# Patient Record
Sex: FEMALE | Race: OTHER | Employment: OTHER | URBAN - METROPOLITAN AREA
[De-identification: names, ages, dates, MRNs, and addresses within clinical notes are randomized per-mention and may not be internally consistent; named-entity substitution may affect disease eponyms.]

---

## 2019-10-25 ENCOUNTER — PREPPED CHART (OUTPATIENT)
Dept: URBAN - METROPOLITAN AREA CLINIC 25 | Facility: CLINIC | Age: 64
End: 2019-10-25

## 2022-02-09 ENCOUNTER — ESTABLISHED PATIENT (OUTPATIENT)
Dept: URBAN - METROPOLITAN AREA CLINIC 25 | Facility: CLINIC | Age: 67
End: 2022-02-09

## 2022-02-09 DIAGNOSIS — H52.4: ICD-10-CM

## 2022-02-09 DIAGNOSIS — H52.13: ICD-10-CM

## 2022-02-09 DIAGNOSIS — H52.222: ICD-10-CM

## 2022-02-09 PROCEDURE — 92310-3 LEVEL 3 CONTACT LENS MANAGEMENT

## 2022-02-09 PROCEDURE — 92014 COMPRE OPH EXAM EST PT 1/>: CPT

## 2022-02-09 PROCEDURE — 92499RSE RETINAL SCREENING ELECTIVE

## 2022-02-09 ASSESSMENT — VISUAL ACUITY
OD_CC: 20/25
OS_CC: 20/25

## 2022-02-09 ASSESSMENT — KERATOMETRY
OD_K1POWER_DIOPTERS: 44.00
OS_AXISANGLE2_DEGREES: 6
OS_K1POWER_DIOPTERS: 43.75
OS_K2POWER_DIOPTERS: 44.25
OD_K2POWER_DIOPTERS: 44.50
OS_AXISANGLE_DEGREES: 96
OD_AXISANGLE2_DEGREES: 173
OD_AXISANGLE_DEGREES: 83

## 2022-02-09 ASSESSMENT — TONOMETRY
OD_IOP_MMHG: 20
OS_IOP_MMHG: 20

## 2024-02-09 ENCOUNTER — ESTABLISHED PATIENT (OUTPATIENT)
Dept: URBAN - METROPOLITAN AREA CLINIC 25 | Facility: CLINIC | Age: 69
End: 2024-02-09

## 2024-02-09 DIAGNOSIS — H52.13: ICD-10-CM

## 2024-02-09 DIAGNOSIS — H52.4: ICD-10-CM

## 2024-02-09 DIAGNOSIS — H52.222: ICD-10-CM

## 2024-02-09 PROCEDURE — 92015 DETERMINE REFRACTIVE STATE: CPT

## 2024-02-09 PROCEDURE — 92499RS OCT RETINAL SCREENING, ELECTIVE

## 2024-02-09 PROCEDURE — 92014 COMPRE OPH EXAM EST PT 1/>: CPT

## 2024-02-09 PROCEDURE — 92310-4 LEVEL 4 CONTACT LENS MANAGEMENT

## 2024-02-09 ASSESSMENT — KERATOMETRY
OD_AXISANGLE_DEGREES: 83
OD_K2POWER_DIOPTERS: 44.50
OD_AXISANGLE_DEGREES: 74
OS_AXISANGLE2_DEGREES: 26
OD_K2POWER_DIOPTERS: 44.75
OS_AXISANGLE_DEGREES: 115
OS_K2POWER_DIOPTERS: 44.25
OD_K1POWER_DIOPTERS: 44.00
OD_K1POWER_DIOPTERS: 43.75
OS_AXISANGLE_DEGREES: 116
OD_AXISANGLE2_DEGREES: 173
OS_AXISANGLE2_DEGREES: 6
OD_AXISANGLE2_DEGREES: 164
OS_K2POWER_DIOPTERS: 44.50
OD_AXISANGLE_DEGREES: 68
OS_K1POWER_DIOPTERS: 44.00
OD_AXISANGLE2_DEGREES: 158
OS_AXISANGLE_DEGREES: 96
OS_AXISANGLE2_DEGREES: 25
OD_K1POWER_DIOPTERS: 43.50
OS_K1POWER_DIOPTERS: 43.75

## 2024-02-09 ASSESSMENT — VISUAL ACUITY
OD_CC: 20/20-1
OS_CC: 20/20-1
OD_CC: J7
OS_CC: J5

## 2024-02-09 ASSESSMENT — TONOMETRY
OS_IOP_MMHG: 19
OD_IOP_MMHG: 19

## 2024-03-07 ENCOUNTER — CONTACT LENSES/GLASSES VISIT (OUTPATIENT)
Dept: URBAN - METROPOLITAN AREA CLINIC 25 | Facility: CLINIC | Age: 69
End: 2024-03-07

## 2024-03-07 DIAGNOSIS — H52.13: ICD-10-CM

## 2024-03-07 PROCEDURE — 92310F

## 2024-03-07 ASSESSMENT — KERATOMETRY
OD_AXISANGLE_DEGREES: 68
OS_AXISANGLE2_DEGREES: 26
OD_AXISANGLE2_DEGREES: 158
OD_AXISANGLE2_DEGREES: 173
OS_AXISANGLE2_DEGREES: 25
OD_K2POWER_DIOPTERS: 44.75
OS_AXISANGLE2_DEGREES: 6
OS_K2POWER_DIOPTERS: 44.25
OD_K1POWER_DIOPTERS: 43.75
OD_K1POWER_DIOPTERS: 43.50
OS_K1POWER_DIOPTERS: 43.75
OD_AXISANGLE_DEGREES: 74
OD_AXISANGLE2_DEGREES: 164
OS_AXISANGLE_DEGREES: 96
OD_AXISANGLE_DEGREES: 83
OD_K1POWER_DIOPTERS: 44.00
OS_AXISANGLE_DEGREES: 116
OD_K2POWER_DIOPTERS: 44.50
OS_K1POWER_DIOPTERS: 44.00
OS_K2POWER_DIOPTERS: 44.50
OS_AXISANGLE_DEGREES: 115

## 2024-03-07 ASSESSMENT — VISUAL ACUITY
OS_OTHER: CL TRIAL #1
OD_CC: J3
OS_CC: 20/40
OD_CC: 20/25
OD_OTHER: CL TRIAL #1
OS_CC: J4

## 2024-03-25 ENCOUNTER — CONTACT LENSES/GLASSES VISIT (OUTPATIENT)
Dept: URBAN - METROPOLITAN AREA CLINIC 25 | Facility: CLINIC | Age: 69
End: 2024-03-25

## 2024-03-25 DIAGNOSIS — H52.13: ICD-10-CM

## 2024-03-25 PROCEDURE — 92310F

## 2024-03-25 ASSESSMENT — KERATOMETRY
OD_AXISANGLE_DEGREES: 83
OD_K1POWER_DIOPTERS: 44.00
OS_K2POWER_DIOPTERS: 44.25
OD_AXISANGLE2_DEGREES: 164
OS_AXISANGLE2_DEGREES: 6
OS_AXISANGLE2_DEGREES: 26
OS_AXISANGLE_DEGREES: 116
OD_K1POWER_DIOPTERS: 43.75
OD_AXISANGLE2_DEGREES: 173
OS_K1POWER_DIOPTERS: 44.00
OD_K1POWER_DIOPTERS: 43.50
OD_AXISANGLE_DEGREES: 68
OD_AXISANGLE2_DEGREES: 158
OS_AXISANGLE_DEGREES: 96
OS_K1POWER_DIOPTERS: 43.75
OD_K2POWER_DIOPTERS: 44.50
OD_K2POWER_DIOPTERS: 44.75
OD_AXISANGLE_DEGREES: 74
OS_K2POWER_DIOPTERS: 44.50
OS_AXISANGLE2_DEGREES: 25
OS_AXISANGLE_DEGREES: 115

## 2024-03-25 ASSESSMENT — VISUAL ACUITY
OU_CC: J2
OS_CC: 20/25
OD_CC: 20/25

## 2024-04-10 ENCOUNTER — CONTACT LENSES/GLASSES VISIT (OUTPATIENT)
Dept: URBAN - METROPOLITAN AREA CLINIC 25 | Facility: CLINIC | Age: 69
End: 2024-04-10

## 2024-04-10 DIAGNOSIS — H52.13: ICD-10-CM

## 2024-04-10 PROCEDURE — 92310F

## 2024-04-10 ASSESSMENT — KERATOMETRY
OS_AXISANGLE2_DEGREES: 25
OD_AXISANGLE2_DEGREES: 173
OS_AXISANGLE_DEGREES: 96
OD_AXISANGLE2_DEGREES: 164
OD_K2POWER_DIOPTERS: 44.75
OS_AXISANGLE2_DEGREES: 6
OD_K1POWER_DIOPTERS: 44.00
OS_AXISANGLE_DEGREES: 116
OD_AXISANGLE_DEGREES: 83
OD_AXISANGLE2_DEGREES: 158
OD_AXISANGLE_DEGREES: 74
OD_K1POWER_DIOPTERS: 43.75
OS_K2POWER_DIOPTERS: 44.50
OS_AXISANGLE2_DEGREES: 26
OD_K1POWER_DIOPTERS: 43.50
OS_K1POWER_DIOPTERS: 43.75
OS_K1POWER_DIOPTERS: 44.00
OD_AXISANGLE_DEGREES: 68
OD_K2POWER_DIOPTERS: 44.50
OS_AXISANGLE_DEGREES: 115
OS_K2POWER_DIOPTERS: 44.25

## 2024-04-10 ASSESSMENT — VISUAL ACUITY
OD_CC: 20/25
OU_CC: J2
OS_CC: 20/25

## 2024-04-22 ENCOUNTER — CONTACT LENSES/GLASSES VISIT (OUTPATIENT)
Dept: URBAN - METROPOLITAN AREA CLINIC 25 | Facility: CLINIC | Age: 69
End: 2024-04-22

## 2024-04-22 DIAGNOSIS — H52.13: ICD-10-CM

## 2024-04-22 PROCEDURE — 92310F

## 2024-04-22 ASSESSMENT — KERATOMETRY
OD_AXISANGLE2_DEGREES: 164
OD_AXISANGLE_DEGREES: 83
OS_AXISANGLE_DEGREES: 116
OS_AXISANGLE2_DEGREES: 6
OD_K2POWER_DIOPTERS: 44.75
OS_K1POWER_DIOPTERS: 43.75
OD_AXISANGLE2_DEGREES: 158
OS_AXISANGLE2_DEGREES: 25
OS_K2POWER_DIOPTERS: 44.50
OS_AXISANGLE2_DEGREES: 26
OD_AXISANGLE_DEGREES: 68
OD_AXISANGLE2_DEGREES: 173
OD_K2POWER_DIOPTERS: 44.50
OS_AXISANGLE_DEGREES: 115
OD_AXISANGLE_DEGREES: 74
OD_K1POWER_DIOPTERS: 43.75
OD_K1POWER_DIOPTERS: 44.00
OS_AXISANGLE_DEGREES: 96
OD_K1POWER_DIOPTERS: 43.50
OS_K2POWER_DIOPTERS: 44.25
OS_K1POWER_DIOPTERS: 44.00

## 2024-04-22 ASSESSMENT — VISUAL ACUITY
OD_CC: J2
OS_CC: J3
OS_CC: 20/30
OD_CC: 20/25

## 2024-04-29 ENCOUNTER — CONTACT LENSES/GLASSES VISIT (OUTPATIENT)
Dept: URBAN - METROPOLITAN AREA CLINIC 25 | Facility: CLINIC | Age: 69
End: 2024-04-29

## 2024-04-29 DIAGNOSIS — H52.13: ICD-10-CM

## 2024-04-29 PROCEDURE — 92310F

## 2024-04-29 ASSESSMENT — VISUAL ACUITY
OD_CC: 20/20
OU_CC: J2
OS_CC: 20/20

## 2024-04-29 ASSESSMENT — KERATOMETRY
OS_AXISANGLE_DEGREES: 116
OD_K1POWER_DIOPTERS: 44.00
OD_K1POWER_DIOPTERS: 43.75
OD_K2POWER_DIOPTERS: 44.75
OD_AXISANGLE2_DEGREES: 173
OD_AXISANGLE_DEGREES: 68
OS_K2POWER_DIOPTERS: 44.50
OD_AXISANGLE_DEGREES: 74
OS_AXISANGLE2_DEGREES: 25
OD_AXISANGLE2_DEGREES: 158
OS_AXISANGLE2_DEGREES: 26
OS_AXISANGLE_DEGREES: 115
OD_AXISANGLE_DEGREES: 83
OS_K2POWER_DIOPTERS: 44.25
OD_K1POWER_DIOPTERS: 43.50
OD_K2POWER_DIOPTERS: 44.50
OS_K1POWER_DIOPTERS: 43.75
OS_AXISANGLE2_DEGREES: 6
OS_AXISANGLE_DEGREES: 96
OS_K1POWER_DIOPTERS: 44.00
OD_AXISANGLE2_DEGREES: 164

## 2024-05-08 ENCOUNTER — CONTACT LENSES/GLASSES VISIT (OUTPATIENT)
Dept: URBAN - METROPOLITAN AREA CLINIC 25 | Facility: CLINIC | Age: 69
End: 2024-05-08

## 2024-05-08 DIAGNOSIS — H52.13: ICD-10-CM

## 2024-05-08 PROCEDURE — 92310F

## 2024-05-08 ASSESSMENT — KERATOMETRY
OS_K2POWER_DIOPTERS: 44.25
OD_AXISANGLE_DEGREES: 74
OS_K1POWER_DIOPTERS: 44.00
OS_AXISANGLE_DEGREES: 96
OD_AXISANGLE_DEGREES: 83
OS_K2POWER_DIOPTERS: 44.50
OD_AXISANGLE_DEGREES: 68
OD_AXISANGLE2_DEGREES: 164
OD_K2POWER_DIOPTERS: 44.50
OD_K1POWER_DIOPTERS: 43.75
OD_K1POWER_DIOPTERS: 43.50
OD_K1POWER_DIOPTERS: 44.00
OD_AXISANGLE2_DEGREES: 173
OS_K1POWER_DIOPTERS: 43.75
OD_K2POWER_DIOPTERS: 44.75
OS_AXISANGLE2_DEGREES: 6
OS_AXISANGLE_DEGREES: 115
OS_AXISANGLE2_DEGREES: 26
OD_AXISANGLE2_DEGREES: 158
OS_AXISANGLE_DEGREES: 116
OS_AXISANGLE2_DEGREES: 25

## 2024-05-08 ASSESSMENT — VISUAL ACUITY
OD_CC: 20/25
OS_CC: 20/25

## 2024-07-26 ENCOUNTER — CONTACT LENSES/GLASSES VISIT (OUTPATIENT)
Dept: URBAN - METROPOLITAN AREA CLINIC 25 | Facility: CLINIC | Age: 69
End: 2024-07-26

## 2024-07-26 DIAGNOSIS — H52.13: ICD-10-CM

## 2024-07-26 PROCEDURE — 92310F

## 2024-07-26 ASSESSMENT — KERATOMETRY
OS_K1POWER_DIOPTERS: 44.00
OS_K2POWER_DIOPTERS: 44.25
OD_K2POWER_DIOPTERS: 44.75
OD_K1POWER_DIOPTERS: 43.50
OS_K1POWER_DIOPTERS: 43.75
OS_AXISANGLE2_DEGREES: 25
OD_K1POWER_DIOPTERS: 44.00
OS_AXISANGLE_DEGREES: 96
OD_AXISANGLE_DEGREES: 83
OD_AXISANGLE2_DEGREES: 164
OD_AXISANGLE2_DEGREES: 158
OD_AXISANGLE_DEGREES: 68
OS_AXISANGLE2_DEGREES: 26
OD_K2POWER_DIOPTERS: 44.50
OS_AXISANGLE2_DEGREES: 6
OS_K2POWER_DIOPTERS: 44.50
OS_AXISANGLE_DEGREES: 116
OD_AXISANGLE_DEGREES: 74
OS_AXISANGLE_DEGREES: 115
OD_K1POWER_DIOPTERS: 43.75
OD_AXISANGLE2_DEGREES: 173

## 2024-07-26 ASSESSMENT — VISUAL ACUITY
OD_CC: 20/30
OS_CC: 20/25
OU_CC: J4

## 2024-08-09 ENCOUNTER — CONTACT LENSES/GLASSES VISIT (OUTPATIENT)
Dept: URBAN - METROPOLITAN AREA CLINIC 25 | Facility: CLINIC | Age: 69
End: 2024-08-09

## 2024-08-09 DIAGNOSIS — H52.13: ICD-10-CM

## 2024-08-09 PROCEDURE — 92310F

## 2024-08-09 ASSESSMENT — KERATOMETRY
OD_AXISANGLE2_DEGREES: 164
OS_AXISANGLE2_DEGREES: 6
OD_K2POWER_DIOPTERS: 44.75
OS_AXISANGLE_DEGREES: 115
OS_K1POWER_DIOPTERS: 43.75
OD_AXISANGLE_DEGREES: 74
OD_AXISANGLE2_DEGREES: 158
OS_AXISANGLE2_DEGREES: 26
OS_K2POWER_DIOPTERS: 44.50
OD_K1POWER_DIOPTERS: 43.75
OD_K2POWER_DIOPTERS: 44.50
OS_K2POWER_DIOPTERS: 44.25
OD_K1POWER_DIOPTERS: 43.50
OD_K1POWER_DIOPTERS: 44.00
OD_AXISANGLE_DEGREES: 83
OS_AXISANGLE_DEGREES: 96
OS_K1POWER_DIOPTERS: 44.00
OD_AXISANGLE_DEGREES: 68
OS_AXISANGLE_DEGREES: 116
OD_AXISANGLE2_DEGREES: 173
OS_AXISANGLE2_DEGREES: 25

## 2024-08-09 ASSESSMENT — VISUAL ACUITY
OS_CC: 20/25
OD_CC: 20/25

## 2024-11-01 ENCOUNTER — CONTACT LENSES/GLASSES VISIT (OUTPATIENT)
Dept: URBAN - METROPOLITAN AREA CLINIC 25 | Facility: CLINIC | Age: 69
End: 2024-11-01

## 2024-11-01 DIAGNOSIS — H52.13: ICD-10-CM

## 2024-11-01 PROCEDURE — 92310F

## 2024-11-19 ENCOUNTER — CONTACT LENSES/GLASSES VISIT (OUTPATIENT)
Dept: URBAN - METROPOLITAN AREA CLINIC 25 | Facility: CLINIC | Age: 69
End: 2024-11-19

## 2024-11-19 DIAGNOSIS — H52.13: ICD-10-CM

## 2024-11-19 PROCEDURE — 92310F

## 2025-03-24 ENCOUNTER — APPOINTMENT (OUTPATIENT)
Dept: URBAN - METROPOLITAN AREA CLINIC 328 | Facility: CLINIC | Age: 70
Setting detail: DERMATOLOGY
End: 2025-03-24

## 2025-03-24 DIAGNOSIS — D22 MELANOCYTIC NEVI: ICD-10-CM | Status: STABLE

## 2025-03-24 DIAGNOSIS — D18.0 HEMANGIOMA: ICD-10-CM | Status: STABLE

## 2025-03-24 DIAGNOSIS — L81.4 OTHER MELANIN HYPERPIGMENTATION: ICD-10-CM | Status: STABLE

## 2025-03-24 DIAGNOSIS — L82.0 INFLAMED SEBORRHEIC KERATOSIS: ICD-10-CM | Status: INADEQUATELY CONTROLLED

## 2025-03-24 DIAGNOSIS — Z85.828 PERSONAL HISTORY OF OTHER MALIGNANT NEOPLASM OF SKIN: ICD-10-CM | Status: STABLE

## 2025-03-24 DIAGNOSIS — L57.0 ACTINIC KERATOSIS: ICD-10-CM

## 2025-03-24 DIAGNOSIS — L82.1 OTHER SEBORRHEIC KERATOSIS: ICD-10-CM | Status: STABLE

## 2025-03-24 PROBLEM — D22.5 MELANOCYTIC NEVI OF TRUNK: Status: ACTIVE | Noted: 2025-03-24

## 2025-03-24 PROBLEM — D48.5 NEOPLASM OF UNCERTAIN BEHAVIOR OF SKIN: Status: ACTIVE | Noted: 2025-03-24

## 2025-03-24 PROBLEM — D18.01 HEMANGIOMA OF SKIN AND SUBCUTANEOUS TISSUE: Status: ACTIVE | Noted: 2025-03-24

## 2025-03-24 PROCEDURE — ? LIQUID NITROGEN

## 2025-03-24 PROCEDURE — ? FULL BODY SKIN EXAM

## 2025-03-24 PROCEDURE — ? BIOPSY BY PUNCH METHOD

## 2025-03-24 PROCEDURE — 11105 PUNCH BX SKIN EA SEP/ADDL: CPT

## 2025-03-24 PROCEDURE — ? ADDITIONAL NOTES

## 2025-03-24 PROCEDURE — ? TREATMENT REGIMEN

## 2025-03-24 PROCEDURE — 17111 DESTRUCTION B9 LESIONS 15/>: CPT

## 2025-03-24 PROCEDURE — 17000 DESTRUCT PREMALG LESION: CPT | Mod: 59

## 2025-03-24 PROCEDURE — 99203 OFFICE O/P NEW LOW 30 MIN: CPT | Mod: 25

## 2025-03-24 PROCEDURE — 11104 PUNCH BX SKIN SINGLE LESION: CPT | Mod: 59

## 2025-03-24 PROCEDURE — 17003 DESTRUCT PREMALG LES 2-14: CPT | Mod: 59

## 2025-03-24 PROCEDURE — ? COUNSELING

## 2025-03-24 ASSESSMENT — LOCATION DETAILED DESCRIPTION DERM
LOCATION DETAILED: EPIGASTRIC SKIN
LOCATION DETAILED: LEFT MEDIAL BREAST 8-9:00 REGION
LOCATION DETAILED: LEFT INFRAMAMMARY CREASE (OUTER QUADRANT)
LOCATION DETAILED: MIDDLE STERNUM
LOCATION DETAILED: LEFT MEDIAL BREAST 7-8:00 REGION
LOCATION DETAILED: LEFT RIB CAGE
LOCATION DETAILED: RIGHT LATERAL BREAST 11-12:00 REGION
LOCATION DETAILED: LEFT ANTERIOR PROXIMAL UPPER ARM
LOCATION DETAILED: LEFT AXILLARY TAIL OF BREAST
LOCATION DETAILED: INFERIOR THORACIC SPINE
LOCATION DETAILED: LEFT ANTERIOR SHOULDER
LOCATION DETAILED: SUBXIPHOID
LOCATION DETAILED: RIGHT LATERAL BREAST 10-11:00 REGION
LOCATION DETAILED: RIGHT MEDIAL SUPERIOR CHEST
LOCATION DETAILED: RIGHT SUPERIOR MEDIAL MIDBACK
LOCATION DETAILED: LEFT MID-UPPER BACK
LOCATION DETAILED: RIGHT INFRAMAMMARY CREASE (INNER QUADRANT)
LOCATION DETAILED: XIPHOID

## 2025-03-24 ASSESSMENT — LOCATION SIMPLE DESCRIPTION DERM
LOCATION SIMPLE: LEFT SHOULDER
LOCATION SIMPLE: RIGHT LOWER BACK
LOCATION SIMPLE: LEFT UPPER BACK
LOCATION SIMPLE: UPPER BACK
LOCATION SIMPLE: RIGHT BREAST
LOCATION SIMPLE: ABDOMEN
LOCATION SIMPLE: CHEST
LOCATION SIMPLE: LEFT BREAST
LOCATION SIMPLE: LEFT UPPER ARM

## 2025-03-24 ASSESSMENT — LOCATION ZONE DERM
LOCATION ZONE: TRUNK
LOCATION ZONE: ARM

## 2025-03-24 NOTE — PROCEDURE: ADDITIONAL NOTES
Render Risk Assessment In Note?: no
Detail Level: Simple
Additional Notes: Patient will consult with  Yamilka for cosmetic procedures.

## 2025-03-24 NOTE — HPI: EVALUATION OF SKIN LESION(S)
What Type Of Note Output Would You Prefer (Optional)?: Bullet Format
Hpi Title: Evaluation of Skin Lesions
Family Member: Father, Uncle

## 2025-03-24 NOTE — PROCEDURE: LIQUID NITROGEN
Show Aperture Variable?: Yes
Post-Care Instructions: I reviewed with the patient in detail post-care instructions. Patient is to wear sunprotection, and avoid picking at any of the treated lesions. Pt may apply Vaseline to crusted or scabbing areas.
Detail Level: Detailed
Render Note In Bullet Format When Appropriate: No
Duration Of Freeze Thaw-Cycle (Seconds): 0
Consent: The patient's consent was obtained including but not limited to risks of crusting, scabbing, blistering, scarring, darker or lighter pigmentary change, recurrence, incomplete removal and infection.
Medical Necessity Clause: This procedure was medically necessary because the lesions that were treated were:
Spray Paint Text: The liquid nitrogen was applied to the skin utilizing a spray paint frosting technique.
Medical Necessity Information: It is in your best interest to select a reason for this procedure from the list below. All of these items fulfill various CMS LCD requirements except the new and changing color options.

## 2025-03-24 NOTE — PROCEDURE: BIOPSY BY PUNCH METHOD
Detail Level: Detailed
Was A Bandage Applied: Yes
Punch Size In Mm: 3
Size Of Lesion In Cm (Optional): 0
Depth Of Punch Biopsy: dermis
Biopsy Type: H and E
Anesthesia Type: 1% lidocaine with epinephrine
Anesthesia Volume In Cc: 1
Hemostasis: None
Epidermal Sutures: gel foam
Wound Care: Vaseline
Dressing: bandage
Patient Will Remove Sutures At Home?: No
Lab: 6
Consent was obtained and risks were reviewed including but not limited to scarring, infection, bleeding, scabbing, incomplete removal, nerve damage and allergy to anesthesia.
Post-Care Instructions: 1. Keep the wound dry and covered with a bandage for the first 24 hours after procedure. Thereafter, change the bandage twice a day.  Gently clean the wound with water and then gently pat the wound dry. Gently apply a thick layer of Vaseline to the wound and cover with a bandage, 2 days after your biopsy.  If the bandage gets wet, replace it with a dry bandage. For facial biopsies, the area may remain uncovered after 3-4 days. For other areas of the body, we recommend covering the wound for 7 days. After 7 days, the wound can remain uncovered.\\n \\n2. We do not recommend Neosporin in our practice due to the percentage of the population that can develop an allergy to it.\\n \\n3. Showering is fine; if the bandage gets wet, just replace it with a dry bandage (no soaking in a bathtub or a pool for 5 days).  \\n\\n4.  If bruising is a concern, Arnica is a supplement that minimizes bruising.  To prevent bruising, the supplement can be taken once daily for 1 week prior to the procedure.  This can also be taken starting on the same day as the procedure to minimize bruising and quicken the healing process if bruises do appear.  If starting on the same day as the procedure, please follow instructions on the package.  This supplement is available at local vitamin shops. \\n\\n5. Your results may take up to 14 days to come in. Our office will call you with results at this time. In some cases, the biopsy will indicate that more skin must be removed in order to remove abnormal cells.  If this is the case, you will then be asked to return to the office for additional information and/or treatment.\\n\\n\\nOnce the wound has healed, to help with minimizing the scar, a product called Scar Recovery Gel can be applied twice a day to the area.  This product will decrease the likelihood of the formation of a hypertrophic scar, and decrease the appearance of red or pink pigment changes in the scar.  The gel has also been shown to significantly decrease itching while the wound heals. Your provider will discuss this product with you after your biopsy results have come in.\\n \\n** Call us if you experience any problems or have any questions.** If it is after hours and you are having bleeding or problems with your wound, please go to the nearest emergency room and Dr. Diana will be contacted as indicated.
Home Suture Removal Text: Patient was provided a home suture removal kit and will remove their sutures at home.  If they have any questions or difficulties they will call the office.
Notification Instructions: Patient will be notified of biopsy results. However, patient instructed to call the office if not contacted within 2 weeks.
Billing Type: Third-Party Bill
Information: Selecting Yes will display possible errors in your note based on the variables you have selected. This validation is only offered as a suggestion for you. PLEASE NOTE THAT THE VALIDATION TEXT WILL BE REMOVED WHEN YOU FINALIZE YOUR NOTE. IF YOU WANT TO FAX A PRELIMINARY NOTE YOU WILL NEED TO TOGGLE THIS TO 'NO' IF YOU DO NOT WANT IT IN YOUR FAXED NOTE.
Punch Size In Mm: 2

## 2025-03-27 ENCOUNTER — COMPREHENSIVE EXAM (OUTPATIENT)
Age: 70
End: 2025-03-27

## 2025-03-27 DIAGNOSIS — H52.13: ICD-10-CM

## 2025-03-27 PROCEDURE — 92014 COMPRE OPH EXAM EST PT 1/>: CPT

## 2025-03-27 PROCEDURE — 92015 DETERMINE REFRACTIVE STATE: CPT

## 2025-03-27 PROCEDURE — 92499RS OCT RETINAL SCREENING, ELECTIVE

## 2025-03-27 PROCEDURE — 92310-2 LEVEL 2 SOFT LENS UPDATE

## 2025-04-02 ENCOUNTER — APPOINTMENT (OUTPATIENT)
Dept: URBAN - METROPOLITAN AREA CLINIC 328 | Facility: CLINIC | Age: 70
Setting detail: DERMATOLOGY
End: 2025-04-02

## 2025-04-02 DIAGNOSIS — L57.0 ACTINIC KERATOSIS: ICD-10-CM

## 2025-04-02 DIAGNOSIS — L72.0 EPIDERMAL CYST: ICD-10-CM

## 2025-04-02 DIAGNOSIS — L82.0 INFLAMED SEBORRHEIC KERATOSIS: ICD-10-CM

## 2025-04-02 PROBLEM — C44.311 BASAL CELL CARCINOMA OF SKIN OF NOSE: Status: ACTIVE | Noted: 2025-04-02

## 2025-04-02 PROCEDURE — ? SURGICAL DECISION MAKING

## 2025-04-02 PROCEDURE — ? ADDITIONAL NOTES

## 2025-04-02 PROCEDURE — ? COUNSELING

## 2025-04-02 PROCEDURE — ? LIQUID NITROGEN

## 2025-04-02 PROCEDURE — 17000 DESTRUCT PREMALG LESION: CPT | Mod: 59,79

## 2025-04-02 PROCEDURE — ? PATHOLOGY DISCUSSION

## 2025-04-02 PROCEDURE — 17110 DESTRUCTION B9 LES UP TO 14: CPT | Mod: 79

## 2025-04-02 PROCEDURE — ? DEFER

## 2025-04-02 PROCEDURE — 99213 OFFICE O/P EST LOW 20 MIN: CPT | Mod: 24,25

## 2025-04-02 ASSESSMENT — LOCATION DETAILED DESCRIPTION DERM
LOCATION DETAILED: LEFT AXILLARY VAULT
LOCATION DETAILED: RIGHT INFRAMAMMARY CREASE (INNER QUADRANT)
LOCATION DETAILED: LEFT INFRAMAMMARY CREASE (INNER QUADRANT)
LOCATION DETAILED: LEFT INFRAMAMMARY CREASE (OUTER QUADRANT)
LOCATION DETAILED: RIGHT INFRAMAMMARY CREASE (OUTER QUADRANT)
LOCATION DETAILED: INFERIOR THORACIC SPINE
LOCATION DETAILED: RIGHT POSTERIOR SHOULDER
LOCATION DETAILED: RIGHT AXILLARY VAULT
LOCATION DETAILED: LEFT MEDIAL BREAST 8-9:00 REGION
LOCATION DETAILED: LEFT RIB CAGE

## 2025-04-02 ASSESSMENT — LOCATION ZONE DERM
LOCATION ZONE: AXILLAE
LOCATION ZONE: ARM
LOCATION ZONE: TRUNK

## 2025-04-02 ASSESSMENT — LOCATION SIMPLE DESCRIPTION DERM
LOCATION SIMPLE: ABDOMEN
LOCATION SIMPLE: UPPER BACK
LOCATION SIMPLE: LEFT BREAST
LOCATION SIMPLE: RIGHT BREAST
LOCATION SIMPLE: RIGHT SHOULDER
LOCATION SIMPLE: LEFT AXILLARY VAULT
LOCATION SIMPLE: RIGHT AXILLARY VAULT

## 2025-04-09 ENCOUNTER — APPOINTMENT (OUTPATIENT)
Dept: URBAN - METROPOLITAN AREA CLINIC 331 | Facility: CLINIC | Age: 70
Setting detail: DERMATOLOGY
End: 2025-04-09

## 2025-04-09 PROBLEM — C44.311 BASAL CELL CARCINOMA OF SKIN OF NOSE: Status: ACTIVE | Noted: 2025-04-09

## 2025-04-09 PROCEDURE — ? CONSULTATION FOR MOHS SURGERY

## 2025-04-09 PROCEDURE — 99213 OFFICE O/P EST LOW 20 MIN: CPT | Mod: 24

## 2025-04-09 PROCEDURE — ? REFERRAL

## 2025-04-15 ENCOUNTER — CONTACT LENSES/GLASSES VISIT (OUTPATIENT)
Age: 70
End: 2025-04-15

## 2025-04-15 DIAGNOSIS — H52.13: ICD-10-CM

## 2025-04-15 PROCEDURE — 92310F

## 2025-05-05 ENCOUNTER — APPOINTMENT (OUTPATIENT)
Dept: URBAN - METROPOLITAN AREA CLINIC 331 | Facility: CLINIC | Age: 70
Setting detail: DERMATOLOGY
End: 2025-05-05

## 2025-05-05 PROBLEM — C44.311 BASAL CELL CARCINOMA OF SKIN OF NOSE: Status: ACTIVE | Noted: 2025-05-05

## 2025-05-05 PROCEDURE — 17312 MOHS ADDL STAGE: CPT

## 2025-05-05 PROCEDURE — 17311 MOHS 1 STAGE H/N/HF/G: CPT

## 2025-05-05 PROCEDURE — ? MOHS SURGERY

## 2025-05-05 NOTE — PROCEDURE: MOHS SURGERY
Mohs Case Number: [id number]
Biopsy Photograph Reviewed: Yes
Consent Type: Consent 1 (Standard)
Eye Shield Used: No
Surgeon Performing Repair (Optional): Dr. Baca
Initial Size Of Lesion: 0.9
Number Of Stages: 1
Primary Defect Length In Cm (Final Defect Size - Required For Flaps/Grafts): 1.2
Primary Defect Depth In Cm (Optional But Required For Some Insurers): 0
Repair Type: Referred to plastics for closure
Which Instrument Did You Use For Dermabrasion?: Wire Brush
Which Eyelid Repair Cpt Are You Using?: 12656
Oculoplastic Surgeon Procedure Text (A): After obtaining clear surgical margins the patient was sent to oculoplastics for surgical repair.  The patient understands they will receive post-surgical care and follow-up from the referring physician's office.
Otolaryngologist Procedure Text (A): After obtaining clear surgical margins the patient was sent to otolaryngology for surgical repair.  The patient understands they will receive post-surgical care and follow-up from the referring physician's office.
Which Plastic Surgeon Are You Referring To?: A
Plastic Surgeon (A): Travis Pearson
Plastic Surgeon Procedure Text (A): After obtaining clear surgical margins the patient was sent to plastics for surgical repair.  The patient understands they will receive post-surgical care and follow-up from the referring physician's office.
Mid-Level Procedure Text (A): After obtaining clear surgical margins the patient was sent to a mid-level provider for surgical repair.  The patient understands they will receive post-surgical care and follow-up from the mid-level provider.
Provider Procedure Text (A): After obtaining clear surgical margins the defect was repaired by another provider.
Asc Procedure Text (A): After obtaining clear surgical margins the patient was sent to an ASC for surgical repair.  The patient understands they will receive post-surgical care and follow-up from the ASC physician.
Deep Sutures: 4-0 Monocryl
Epidermal Sutures: 5-0 Polypropylene
Suture Removal: 7 days
Suturegard Retention Suture: 2-0 Nylon
Retention Suture Bite Size: 3 mm
Length To Time In Minutes Device Was In Place: 10
Undermining Type: Entire Wound
Debridement Text: The wound edges were debrided prior to proceeding with the closure to facilitate wound healing.
Helical Rim Text: The closure involved the helical rim.
Vermilion Border Text: The closure involved the vermilion border.
Nostril Rim Text: The closure involved the nostril rim.
Retention Suture Text: Retention sutures were placed to support the closure and prevent dehiscence.
Area H Indication Text: Tumors in this location are included in Area H (eyelids, eyebrows, nose, lips, chin, ear, pre-auricular, post-auricular, temple, genitalia, hands, feet, ankles and areola). Mohs surgery is indicated for tumors in these anatomic locations. Tissue conservation is critical in these anatomic locations to maximize functional and aesthetic outcomes.\\n\\n<b>Detailed documentation of histology, including histopathologic findings on microscopic examination of the tumor during today's Mohs procedure, are notated along with the Mohs maps from each stage of Mohs micrographic surgery.</b>
Area M Indication Text: Tumors in this location are included in Area M (cheek, forehead, scalp, neck, jawline, and pretibial skin). Mohs surgery is indicated for tumors in these anatomic locations. Tissue conservation is critical in these anatomic locations to maximize functional and aesthetic outcomes.\\n\\n<b>Detailed documentation of histology, including histopathologic findings on microscopic examination of the tumor during today's Mohs procedure, are notated along with the Mohs maps from each stage of Mohs micrographic surgery.</b>
Area L Indication Text: Tumors in this location are included in Area L (trunk and extremities).  Mohs surgery is indicated for larger tumors, tumors with aggressive histologic features, recurrent tumors, or tumors previously excised with positive margins in these anatomic locations.\\n\\n<b>Detailed documentation of histology, including histopathologic findings on microscopic examination of the tumor during today's Mohs procedure, are notated along with the Mohs maps from each stage of Mohs micrographic surgery.</b>
Special Stains Stage 1 - Results: Base On Clearance Noted Above
Stage 2: Additional Anesthesia Type: 1% lidocaine with epinephrine
Staging Info: By selecting yes to the question above you will include information on AJCC 8 tumor staging in your Mohs note. Information on tumor staging will be automatically added for SCCs on the head and neck. AJCC 8 includes tumor size, tumor depth, perineural involvement and bone invasion.
Tumor Depth: Less than 6mm from granular layer and no invasion beyond the subcutaneous fat
Was The Patient On Physician Recommended Anticoagulation Therapy?: Please Select the Appropriate Response
Medical Necessity Statement: Based on my medical judgement, Mohs surgery is medically necessary as it is the most appropriate treatment for this cancer compared to other treatments.
Alternatives Discussed Intro (Do Not Add Period): I discussed alternative treatments to Mohs surgery and specifically discussed the risks and benefits of
Consent 1/Introductory Paragraph: The rationale for Mohs surgery was explained to the patient and written informed consent was obtained. The risks, benefits, and alternatives to Mohs Surgery were discussed in detail. Specifically, the risks of infection, scarring, bleeding, prolonged wound healing, incomplete removal, allergy to anesthesia, nerve injury, and recurrence were addressed. Prior to beginning the procedure, the surgical site was identified with the assistance of the patient and the medical record, including photographs and/or maps if available. The site was then clearly confirmed by the patient by direct visualization and/or the use of a hand mirror and a cotton-tipped applicator stick. All components of Universal Protocol/PAUSE Rule were completed: Prior to beginning the procedure, a pause was taken during which the surgeon, his assistant, and the patient verbally confirmed the patient's identification, the intended procedure, and the correct surgical site. Pause time: 8:22 am
Consent 2/Introductory Paragraph: The rationale for Mohs surgery was explained to the patient and written informed consent was obtained. The risks, benefits, and alternatives to Mohs Surgery were discussed in detail. Specifically, the risks of infection, scarring, bleeding, prolonged wound healing, incomplete removal, allergy to anesthesia, nerve injury, and recurrence were addressed. Given the site on the leg, the patient was also counseled about the high risk of failure of surgical repair, wound falling apart (dehiscence), infection, and chronic wound formation, which could take months or longer to heal. The patient expressed understanding and desired to proceed. Prior to beginning the procedure, the surgical site was identified with the assistance of the patient and the medical record, including photographs and/or maps if available. The site was then clearly confirmed by the patient by direct visualization and/or the use of a hand mirror and a cotton-tipped applicator stick. All components of Universal Protocol/PAUSE Rule were completed: Prior to beginning the procedure, a pause was taken during which the surgeon, his assistant, and the patient verbally confirmed the patient's identification, the intended procedure, and the correct surgical site. Pause time:
Consent 3/Introductory Paragraph: The rationale for Mohs surgery was explained to the patient and written informed consent was obtained. The risks, benefits, and alternatives to Mohs Surgery were discussed in detail. Specifically, the risks of infection, scarring, bleeding, prolonged wound healing, incomplete removal, allergy to anesthesia, nerve injury, and recurrence were addressed. Given the site, the patient was also counseled about the risk of nerve damage, which could result in potentially permanent tingling, paresthesia, or lack of sensation on this side of the forehead and / or scalp. The patient expressed understanding and desired to proceed. Prior to beginning the procedure, the surgical site was identified with the assistance of the patient and the medical record, including photographs and/or maps if available. The site was then clearly confirmed by the patient by direct visualization and/or the use of a hand mirror and a cotton-tipped applicator stick. All components of Universal Protocol/PAUSE Rule were completed: Prior to beginning the procedure, a pause was taken during which the surgeon, his assistant, and the patient verbally confirmed the patient's identification, the intended procedure, and the correct surgical site. Pause time:
Consent (Temporal Branch)/Introductory Paragraph: The rationale for Mohs surgery was explained to the patient and written informed consent was obtained. The risks, benefits, and alternatives to Mohs Surgery were discussed in detail. Specifically, the risks of infection, scarring, bleeding, prolonged wound healing, incomplete removal, allergy to anesthesia, nerve injury, and recurrence were addressed. Given the site, the patient was also counseled about the risk of damage to the temporal branch of the facial nerve, potentially permanently removing the ability to raise the eyebrow on this side of the face. The patient expressed understanding and desired to proceed. Prior to beginning the procedure, the surgical site was identified with the assistance of the patient and the medical record, including photographs and/or maps if available. The site was then clearly confirmed by the patient by direct visualization and/or the use of a hand mirror and a cotton-tipped applicator stick. All components of Universal Protocol/PAUSE Rule were completed: Prior to beginning the procedure, a pause was taken during which the surgeon, his assistant, and the patient verbally confirmed the patient's identification, the intended procedure, and the correct surgical site. Pause time:
Consent (Marginal Mandibular)/Introductory Paragraph: The rationale for Mohs surgery was explained to the patient and written informed consent was obtained. The risks, benefits, and alternatives to Mohs Surgery were discussed in detail. Specifically, the risks of infection, scarring, bleeding, prolonged wound healing, incomplete removal, allergy to anesthesia, nerve injury, and recurrence were addressed. Given the site, the patient was also counseled about the risk of damage to the marginal mandibular nerve, potentially permanently resulting in an asymmetrical smile, inability to pull the lower lip downward or outward, inability to rotate lip outward, and drooling. The patient expressed understanding and desired to proceed. Prior to beginning the procedure, the surgical site was identified with the assistance of the patient and the medical record, including photographs and/or maps if available. The site was then clearly confirmed by the patient by direct visualization and/or the use of a hand mirror and a cotton-tipped applicator stick. All components of Universal Protocol/PAUSE Rule were completed: Prior to beginning the procedure, a pause was taken during which the surgeon, his assistant, and the patient verbally confirmed the patient's identification, the intended procedure, and the correct surgical site. Pause time:
Consent (Spinal Accessory)/Introductory Paragraph: The rationale for Mohs surgery was explained to the patient and written informed consent was obtained. The risks, benefits, and alternatives to Mohs Surgery were discussed in detail. Specifically, the risks of infection, scarring, bleeding, prolonged wound healing, incomplete removal, allergy to anesthesia, nerve injury, and recurrence were addressed. Given the site, the patient was also counseled about the risk of damage to the spinal accessory nerve, potentially permanently resulting in chronic aching of the shoulders, inability to abduct the shoulder, wasting of the shoulder (trapezius) muscles, dropped shoulder, and paresthesias in the arm. The patient expressed understanding and desired to proceed. Prior to beginning the procedure, the surgical site was identified with the assistance of the patient and the medical record, including photographs and/or maps if available. The site was then clearly confirmed by the patient by direct visualization and/or the use of a hand mirror and a cotton-tipped applicator stick. All components of Universal Protocol/PAUSE Rule were completed: Prior to beginning the procedure, a pause was taken during which the surgeon, his assistant, and the patient verbally confirmed the patient's identification, the intended procedure, and the correct surgical site. Pause time:
Consent (Near Eyelid Margin)/Introductory Paragraph: The rationale for Mohs was explained to the patient and consent was obtained. The risks, benefits and alternatives to therapy were discussed in detail. Specifically, the risks of ectropion or eyelid deformity, infection, scarring, bleeding, prolonged wound healing, incomplete removal, allergy to anesthesia, nerve injury and recurrence were addressed. Prior to beginning the procedure, the surgical site was identified with the assistance of the patient and the medical record, including photographs and/or maps if available. The site was then clearly confirmed by the patient by direct visualization and/or the use of a hand mirror and a cotton-tipped applicator stick. All components of Universal Protocol/PAUSE Rule were completed: Prior to beginning the procedure, a pause was taken during which the surgeon, his assistant, and the patient verbally confirmed the patient's identification, the intended procedure, and the correct surgical site. Pause time:
Consent (Ear)/Introductory Paragraph: The rationale for Mohs was explained to the patient and consent was obtained. The risks, benefits and alternatives to therapy were discussed in detail. Specifically, the risks of ear deformity, infection, scarring, bleeding, prolonged wound healing, incomplete removal, allergy to anesthesia, nerve injury and recurrence were addressed. Prior to beginning the procedure, the surgical site was identified with the assistance of the patient and the medical record, including photographs and/or maps if available. The site was then clearly confirmed by the patient by direct visualization and/or the use of a hand mirror and a cotton-tipped applicator stick. All components of Universal Protocol/PAUSE Rule were completed: Prior to beginning the procedure, a pause was taken during which the surgeon, his assistant, and the patient verbally confirmed the patient's identification, the intended procedure, and the correct surgical site. Pause time:
Consent (Nose)/Introductory Paragraph: The rationale for Mohs was explained to the patient and consent was obtained. The risks, benefits and alternatives to therapy were discussed in detail. Specifically, the risks of nasal deformity, nasal asymmetry, changes in the flow of air through the nose, infection, scarring, bleeding, prolonged wound healing, incomplete removal, allergy to anesthesia, nerve injury and recurrence were addressed. Prior to beginning the procedure, the surgical site was identified with the assistance of the patient and the medical record, including photographs and/or maps if available. The site was then clearly confirmed by the patient by direct visualization and/or the use of a hand mirror and a cotton-tipped applicator stick. All components of Universal Protocol/PAUSE Rule were completed: Prior to beginning the procedure, a pause was taken during which the surgeon, his assistant, and the patient verbally confirmed the patient's identification, the intended procedure, and the correct surgical site. Pause time:
Consent (Lip)/Introductory Paragraph: The rationale for Mohs was explained to the patient and consent was obtained. The risks, benefits and alternatives to therapy were discussed in detail. Specifically, the risks of lip deformity, changes in the oral aperture, infection, scarring, bleeding, prolonged wound healing, incomplete removal, allergy to anesthesia, nerve injury and recurrence were addressed. Prior to beginning the procedure, the surgical site was identified with the assistance of the patient and the medical record, including photographs and/or maps if available. The site was then clearly confirmed by the patient by direct visualization and/or the use of a hand mirror and a cotton-tipped applicator stick. All components of Universal Protocol/PAUSE Rule were completed: Prior to beginning the procedure, a pause was taken during which the surgeon, his assistant, and the patient verbally confirmed the patient's identification, the intended procedure, and the correct surgical site. Pause time:
Consent (Scalp)/Introductory Paragraph: The rationale for Mohs was explained to the patient and consent was obtained. The risks, benefits and alternatives to therapy were discussed in detail. Specifically, the risks of changes in hair growth pattern secondary to repair, infection, scarring, bleeding, prolonged wound healing, incomplete removal, allergy to anesthesia, nerve injury and recurrence were addressed. Prior to beginning the procedure, the surgical site was identified with the assistance of the patient and the medical record, including photographs and/or maps if available. The site was then clearly confirmed by the patient by direct visualization and/or the use of a hand mirror and a cotton-tipped applicator stick. All components of Universal Protocol/PAUSE Rule were completed: Prior to beginning the procedure, a pause was taken during which the surgeon, his assistant, and the patient verbally confirmed the patient's identification, the intended procedure, and the correct surgical site. Pause time:
Detail Level: Detailed
Postop Diagnosis: same
Anesthesia Type: 0.5% lidocaine with 1:200,000 epinephrine and a 1:10 solution of 8.4% sodium bicarbonate
Anesthesia Volume In Cc: 6
Hemostasis: Electrocautery
Estimated Blood Loss (Cc): minimal
Repair Anesthesia Method: local infiltration
Anesthesia Volume In Cc: 1.5
Repair Hemostasis (Optional): Pressure
Brow Lift Text: A midfrontal incision was made medially to the defect to allow access to the tissues just superior to the left eyebrow. Following careful dissection inferiorly in a supraperiosteal plane to the level of the left eyebrow, several 3-0 monocryl sutures were used to resuspend the eyebrow orbicularis oculi muscular unit to the superior frontal bone periosteum. This resulted in an appropriate reapproximation of static eyebrow symmetry and correction of the left brow ptosis.
Suturegard Intro: Intraoperative tissue expansion was performed, utilizing the SUTUREGARD device, in order to reduce wound tension.
Suturegard Body: The suture ends were repeatedly re-tightened and re-clamped to achieve the desired tissue expansion.
Hemigard Intro: Due to skin fragility and wound tension, it was decided to use HEMIGARD adhesive retention suture devices to permit a linear closure. The skin was cleaned and dried for a 6cm distance away from the wound. Excessive hair, if present, was removed to allow for adhesion.
Hemigard Postcare Instructions: The HEMIGARD strips are to remain completely dry for at least 5-7 days.
Donor Site Anesthesia Type: same as repair anesthesia
Epidermal Closure Graft Donor Site (Optional): none-secondary intention
Graft Donor Site Bandage (Optional-Leave Blank If You Don't Want In Note): Hemostasis was meticulously ensured achieved, gelfoam was applied, and a pressure dressing was placed.
Closure 2 Information: This tab is for additional flaps and grafts, including complex repair and grafts and complex repair and flaps. You can also specify a different location for the additional defect, if the location is the same you do not need to select a new one. We will insert the automated text for the repair you select below just as we do for solitary flaps and grafts. Please note that at this time if you select a location with a different insurance zone you will need to override the ICD10 and CPT if appropriate.
Closure 3 Information: This tab is for additional flaps and grafts above and beyond our usual structured repairs.  Please note if you enter information here it will not currently bill and you will need to add the billing information manually.
Wound Care: Petrolatum
Dressing: pressure dressing
Dressing (No Sutures): dry sterile dressing
Post-Care Instructions: MOHS/ SURGERY POST-OPERATIVE INSTRUCTIONS\\n\\nWOUND CARE\\nKeep the bandage dry until 24 hours after surgery. Thereafter, change the bandage twice a day until sutures are removed. You may soak the bandage to help it peel off. Gently clean the wound with a Dial bar soap and water, and then gently pat the wound dry. Gently apply a thick layer of Vaseline to the wound, or Mupirocin if it has been prescribed to you. Cover the wound with a Band-Aid or non-stick gauze (e.g. Telfa®), and paper tape. Repeat this process daily until sutures are removed.\\n\\nWe do not recommend using over-the-counter antibiotic ointment given the high chance of developing allergic reactions in covered surgical wounds. Do not apply alcohol or hydrogen peroxide directly to the wound. \\n\\n \\n\\nFor hands, wrists, and forearms:\\nAfter surgery, you will be in a bulky dressing (bandage) with a velcro splint that goes from the hand to the middle of the forearm, with the fingers free. The splint is similar to a cast, however; the purpose of this splint is to decrease the mobility of your hand to prevent over working incision site. The splint protects the incision and the surgical repair, as well as lessen swelling. The splint can be removed and must be kept dry. When showering or bathing, remove bandage and the splint and refer to post op instructions for wound care.  Elevate your hand above your heart as much as possible to lessen swelling and pain. Pillows and blankets under the arm are helpful when you go to sleep.\\n\\n\\nBLEEDING\\nIt is fairly common for the incision to ooze or bleed, especially in the first several hours after surgery. This can be controlled by removing the bandage we applied and then applying constant, direct pressure to the bleeding site with a clean bandage or paper towel for 20 minutes (without peeking). If the bleeding continues, repeat the above procedure for an additional 20 minutes. If the incision continues to bleed after this time, call the office at [phone]. \\n\\nDISCOMFORT\\nIf you have discomfort following surgery, take two Extra Strength Tylenol® (total of 1,000 mg) every 6 hours OR a prescription pain medication if one has been prescribed to you. If this is not sufficient to control the pain, please call the office. AVOID medications that contain aspirin, ibuprofen, or naproxyn (e.g. Alleve®, Excedrin®, Bufferin®), as these products increase the risk of bleeding.\\n\\nSWELLING\\nLocal swelling is common after surgery. Apply an ice pack over the dressing – on for 20 minutes and off for 1 hour. Repeat until bedtime. You may continue this, if necessary, as long as the swelling persists. This will help with swelling, pain, and bleeding.\\n\\nACTIVITY\\nPlease refrain from any strenuous physical activity until your sutures have been removed. This includes lifting weights, going to the gym, or doing any type of stretching in the area the surgery was done. Any of these activities could cause your sutures to break and open your wound.\\n\\nALCOHOL\\nAvoid drinking alcohol for 48 hours following surgery, as alcohol increases the risk of bleeding.\\n\\nSMOKING\\nTo promote better healing and reduce the chance of complications, it is STRONGLY RECOMMENDED THAT YOU REFRAIN FROM SMOKING until the sutures are removed.\\n\\nSHOWERING\\nUnless instructed otherwise, you may resume showering 24 hours after surgery. \\n\\nSUTURE REMOVAL\\nWhen wounds are sutured, there are generally two layers of stitches placed. There are invisible stitches under the skin and visible ones above the skin. Stitches above the skin are removed in 1-2 weeks as instructed. Stitches under the skin absorb on their own in 8 weeks to 6 months depending on the type of material used. Occasionally, one of the stitches under the skin may work itself through the skin before being absorbed. If this occurs, call the office so we can remove this “spitting” deep suture.\\n\\nFOLLOW-UP\\nIt is imperative that you have routine follow-up with your dermatologist for skin checks. This should be arranged through your dermatologist’s office. \\n\\n\\nIf Home Health was recommended for your wound, you may contact them within 24 hours if you have not heard from them contact [facility] [phone].\\n\\n\\nIf you have any questions or concerns regarding your surgery, please call the office at \\[phone].
Pain Refusal Text: I offered to prescribe pain medication but the patient refused to take this medication.
Mauc Instructions: By selecting yes to the question below the MAUC number will be added into the note.  This will be calculated automatically based on the diagnosis chosen, the size entered, the body zone selected (H,M,L) and the specific indications you chose. You will also have the option to override the Mohs AUC if you disagree with the automatically calculated number and this option is found in the Case Summary tab.
Where Do You Want The Question To Include Opioid Counseling Located?: Case Summary Tab
Eye Protection Verbiage: Before proceeding with the stage, a plastic scleral shield was inserted. The globe was anesthetized with a few drops of 1% lidocaine with 1:100,000 epinephrine. Then, an appropriate sized scleral shield was chosen and coated with lacrilube ointment. The shield was gently inserted and left in place for the duration of each stage. After the stage was completed, the shield was gently removed.
Mohs Method Verbiage: Mohs micrographic surgery was performed in the standard fashion with all frozen sections examined by the surgeon.  The discernable tumor mass and a narrow margin of surrounding skin, was excised as a microscopically-controlled section.
Surgeon/Pathologist Verbiage (Will Incorporate Name Of Surgeon From Intro If Not Blank): operated in two distinct and integrated capacities as the surgeon and pathologist.
Mohs Histo Method Verbiage: Each section was then chromacoded and processed in the Mohs lab using the Mohs protocol and submitted for tissue processing: The tissue was marked with dyes for orientation, mapped on an image, frozen, stained with toluidine blue, and microscopically examined by the surgeon.
Subsequent Stages Histo Method Verbiage: The presence of tumor at the surgical margins of the previous stage of Mohs micrographic surgery necessitated the excision of additional tissue for tumor clearance. Using a similar technique to that described above, a thin layer of tissue was removed from all areas where tumor was visible on the previous stage. The tissue was again marked with dyes for orientation, mapped on an image, frozen, stained, and microscopically examined by the surgeon.
Mohs Rapid Report Verbiage: The area of clinically evident tumor was marked with skin marking ink and appropriately hatched.  The initial incision was made following the Mohs approach through the skin.  The specimen was taken to the lab, divided into the necessary number of pieces, chromacoded and processed according to the Mohs protocol.  This was repeated in successive stages until a tumor free defect was achieved.
Complex Repair Preamble Text (Leave Blank If You Do Not Want): To reduce the postoperative risks of hemorrhage, scarring, and infection, a complex linear repair was deemed to be medically necessary.
Crescentic Complex Repair Preamble Text (Leave Blank If You Do Not Want): Extensive wide undermining was performed.
Intermediate Repair Preamble Text (Leave Blank If You Do Not Want): To reduce the postoperative risks of hemorrhage, scarring, and infection, an intermediate linear repair was deemed to be medically necessary.
Crescentic Intermediate Repair Preamble Text (Leave Blank If You Do Not Want): Undermining was performed with blunt dissection.
Graft Cartilage Fenestration Text: A wound base of exposed cartilage was noted, and so the wound was prepared to receive a graft by creating several fenestrations in the bare cartilage with a 2mm punch, removing this cartilage to create a healthy vascular tissue bed.
Secondary Intention Text (Leave Blank If You Do Not Want): The defect will heal with secondary intention.
No Repair - Repaired With Adjacent Surgical Defect Text (Leave Blank If You Do Not Want): After obtaining clear surgical margins the defect was repaired concurrently with another surgical defect which was in close approximation.
Adjacent Tissue Transfer Text: The surgical defect and surrounding skin were prepped with Betadine. The choice of the repair was performed because extensive undermining was required, to close a large gap created by lesion removal, and to reduce tension to enhance both functional and cosmetic results. The defect edges were debeveled with a #15 scalpel blade.  Given the size and location of the defect, an adjacent tissue transfer was deemed most appropriate. Using a sterile surgical marker, an appropriate flap was drawn incorporating the defect and placing the expected incisions within the relaxed skin tension lines where possible. The area thus outlined was incised deep to adipose tissue with a #15 scalpel blade. The skin margins were undermined to an appropriate distance in all directions. Following this, the designed flap was advanced and carried over into the primary defect and sutured into place. The subcutaneous tissue and dermis were closed with 4-0 Monocryl. Epidermal closure was achieved with 5-0 Polypropylene (running).  During the repair hemostasis was achieved with Pressure. Petrolatum + pressure dressing were applied.
Advancement Flap (Single) Text: The surgical defect and surrounding skin were prepped with Betadine. The choice of the repair was performed because extensive undermining was required, to close a large gap created by lesion removal, and to reduce tension to enhance both functional and cosmetic results. The defect edges were debeveled with a #15 scalpel blade.  Given the size and location of the defect, a single advancement flap was deemed most appropriate. Using a sterile surgical marker, an appropriate advancement flap was drawn incorporating the defect and placing the expected incisions within the relaxed skin tension lines where possible. The area thus outlined was incised deep to adipose tissue with a #15 scalpel blade. The skin margins were undermined to an appropriate distance in all directions. Following this, the designed flap was advanced and carried over into the primary defect and sutured into place. The subcutaneous tissue and dermis were closed with 4-0 Monocryl. Epidermal closure was achieved with 5-0 Polypropylene (running).  During the repair hemostasis was achieved with Pressure. Petrolatum + pressure dressing were applied.
Advancement Flap (Double) Text: The surgical defect and surrounding skin were prepped with Betadine. The choice of the repair was performed because extensive undermining was required, to close a large gap created by lesion removal, and to reduce tension to enhance both functional and cosmetic results. The defect edges were debeveled with a #15 scalpel blade.  Given the size and location of the defect, an advancement flap was deemed most appropriate. Using a sterile surgical marker, an appropriate advancement flap was drawn incorporating the defect and placing the expected incisions within the relaxed skin tension lines where possible. The area thus outlined was incised deep to adipose tissue with a #15 scalpel blade. The skin margins were undermined to an appropriate distance in all directions. Following this, the designed flap was advanced and carried over into the primary defect and sutured into place. The subcutaneous tissue and dermis were closed with 4-0 Monocryl. Epidermal closure was achieved with 5-0 Polypropylene (running).  During the repair hemostasis was achieved with Pressure. Petrolatum + pressure dressing were applied.
Advancement-Rotation Flap Text: The defect edges were debeveled with a #15 scalpel blade.  Given the location of the defect, shape of the defect and the proximity to free margins an advancement-rotation flap was deemed most appropriate.  Using a sterile surgical marker, an appropriate flap was drawn incorporating the defect and placing the expected incisions within the relaxed skin tension lines where possible. The area thus outlined was incised deep to adipose tissue with a #15 scalpel blade.  The skin margins were undermined to an appropriate distance in all directions utilizing iris scissors.
Alar Island Pedicle Flap Text: The defect edges were debeveled with a #15 scalpel blade.  Given the location of the defect, shape of the defect and the proximity to the alar rim an island pedicle advancement flap was deemed most appropriate.  Using a sterile surgical marker, an appropriate advancement flap was drawn incorporating the defect, outlining the appropriate donor tissue and placing the expected incisions within the nasal ala running parallel to the alar rim. The area thus outlined was incised with a #15 scalpel blade.  The skin margins were undermined minimally to an appropriate distance in all directions around the primary defect and laterally outward around the island pedicle utilizing iris scissors.  There was minimal undermining beneath the pedicle flap.
A-T Advancement Flap Text: The surgical defect and surrounding skin were prepped with Betadine. The choice of the repair was performed because extensive undermining was required, to close a large gap created by lesion removal, and to reduce tension to enhance both functional and cosmetic results. The defect edges were debeveled with a #15 scalpel blade.  Given the size and location of the defect, an A-to-T advancement flap was deemed most appropriate. Using a sterile surgical marker, an appropriate A-to-T advancement flap was drawn incorporating the defect and placing the expected incisions within the relaxed skin tension lines where possible. The area thus outlined was incised deep to adipose tissue with a #15 scalpel blade. The skin margins were undermined to an appropriate distance in all directions. Following this, the designed flap was advanced and carried over into the primary defect and sutured into place. The subcutaneous tissue and dermis were closed with 4-0 Monocryl. Epidermal closure was achieved with 5-0 Polypropylene (running).  During the repair hemostasis was achieved with Pressure. Petrolatum + pressure dressing were applied.
Banner Transposition Flap Text: The defect edges were debeveled with a #15 scalpel blade.  Given the location of the defect and the proximity to free margins a Banner transposition flap was deemed most appropriate.  Using a sterile surgical marker, an appropriate flap drawn around the defect. The area thus outlined was incised deep to adipose tissue with a #15 scalpel blade.  The skin margins were undermined to an appropriate distance in all directions utilizing iris scissors.
Bilateral Helical Rim Advancement Flap Text: The defect edges were debeveled with a #15 blade scalpel.  Given the location of the defect and the proximity to free margins (helical rim) a bilateral helical rim advancement flap was deemed most appropriate.  Using a sterile surgical marker, the appropriate advancement flaps were drawn incorporating the defect and placing the expected incisions between the helical rim and antihelix where possible.  The area thus outlined was incised through and through with a #15 scalpel blade.  With a skin hook and iris scissors, the flaps were gently and sharply undermined and freed up.
Bilateral Rotation Flap Text: The defect edges were debeveled with a #15 scalpel blade. Given the location of the defect, shape of the defect and the proximity to free margins a bilateral rotation flap was deemed most appropriate. Using a sterile surgical marker, an appropriate rotation flap was drawn incorporating the defect and placing the expected incisions within the relaxed skin tension lines where possible. The area thus outlined was incised deep to adipose tissue with a #15 scalpel blade. The skin margins were undermined to an appropriate distance in all directions utilizing iris scissors. Following this, the designed flap was carried over into the primary defect and sutured into place.
Bilobed Flap Text: The surgical defect and surrounding skin were prepped with Betadine. The choice of the repair was performed because extensive undermining was required, to close a large gap created by lesion removal, and to reduce tension to enhance both functional and cosmetic results. The defect edges were debeveled with a #15 scalpel blade.  Given the size and location of the defect, a bilobed transposition flap was deemed most appropriate. Using a sterile surgical marker, an appropriate bilobed transposition flap was drawn incorporating the defect and placing the expected incisions within the relaxed skin tension lines where possible. The area thus outlined was incised deep to adipose tissue with a #15 scalpel blade. The skin margins were undermined to an appropriate distance in all directions. Following this, the designed flap was advanced and carried over into the primary defect and sutured into place. The subcutaneous tissue and dermis were closed with 4-0 Monocryl. Epidermal closure was achieved with 5-0 Polypropylene (running).  During the repair hemostasis was achieved with Pressure. Petrolatum + pressure dressing were applied.
Bi-Rhombic Flap Text: The defect edges were debeveled with a #15 scalpel blade.  Given the location of the defect and the proximity to free margins a bi-rhombic flap was deemed most appropriate.  Using a sterile surgical marker, an appropriate rhombic flap was drawn incorporating the defect. The area thus outlined was incised deep to adipose tissue with a #15 scalpel blade.  The skin margins were undermined to an appropriate distance in all directions utilizing iris scissors.
Burow's Advancement Flap Text: The defect edges were debeveled with a #15 scalpel blade.  Given the location of the defect and the proximity to free margins a Burow's advancement flap was deemed most appropriate.  Using a sterile surgical marker, the appropriate advancement flap was drawn incorporating the defect and placing the expected incisions within the relaxed skin tension lines where possible.    The area thus outlined was incised deep to adipose tissue with a #15 scalpel blade.  The skin margins were undermined to an appropriate distance in all directions utilizing iris scissors.
Chonodrocutaneous Helical Advancement Flap Text: The defect edges were debeveled with a #15 scalpel blade.  Given the location of the defect and the proximity to free margins a chondrocutaneous helical advancement flap was deemed most appropriate.  Using a sterile surgical marker, the appropriate advancement flap was drawn incorporating the defect and placing the expected incisions within the relaxed skin tension lines where possible.    The area thus outlined was incised deep to adipose tissue with a #15 scalpel blade.  The skin margins were undermined to an appropriate distance in all directions utilizing iris scissors.
Crescentic Advancement Flap Text: The surgical defect and surrounding skin were prepped with Betadine. The choice of the repair was performed because extensive undermining was required, to close a large gap created by lesion removal, and to reduce tension to enhance both functional and cosmetic results. The defect edges were debeveled with a #15 scalpel blade.  Given the size and location of the defect, a crescentic advancement flap was deemed most appropriate. Using a sterile surgical marker, an appropriate crescentic flap was drawn incorporating the defect and placing the expected incisions within the relaxed skin tension lines where possible. The area thus outlined was incised deep to adipose tissue with a #15 scalpel blade. The skin margins were undermined to an appropriate distance in all directions. Following this, the designed flap was advanced and carried over into the primary defect and sutured into place. The subcutaneous tissue and dermis were closed with 4-0 Monocryl. Epidermal closure was achieved with 5-0 Polypropylene (running).  During the repair hemostasis was achieved with Pressure. Petrolatum + pressure dressing were applied.
Dorsal Nasal Flap Text: The surgical defect and surrounding skin were prepped with Betadine. The choice of the repair was performed because extensive undermining was required, to close a large gap created by lesion removal, and to reduce tension to enhance both functional and cosmetic results. The defect edges were debeveled with a #15 scalpel blade.  Given the size and location of the defect, a dorsal nasal rotation flap was deemed most appropriate. Using a sterile surgical marker, an appropriate dorsal nasal rotation flap was drawn incorporating the defect and placing the expected incisions within the relaxed skin tension lines where possible. The area thus outlined was incised deep to adipose tissue with a #15 scalpel blade. The skin margins were undermined to an appropriate distance in all directions. Following this, the designed flap was advanced and carried over into the primary defect and sutured into place. The subcutaneous tissue and dermis were closed with 4-0 Monocryl. Epidermal closure was achieved with 5-0 Polypropylene (running).  During the repair hemostasis was achieved with Pressure. Petrolatum + pressure dressing were applied.
Double Island Pedicle Flap Text: The defect edges were debeveled with a #15 scalpel blade.  Given the location of the defect, shape of the defect and the proximity to free margins a double island pedicle advancement flap was deemed most appropriate.  Using a sterile surgical marker, an appropriate advancement flap was drawn incorporating the defect, outlining the appropriate donor tissue and placing the expected incisions within the relaxed skin tension lines where possible.    The area thus outlined was incised deep to adipose tissue with a #15 scalpel blade.  The skin margins were undermined to an appropriate distance in all directions around the primary defect and laterally outward around the island pedicle utilizing iris scissors.  There was minimal undermining beneath the pedicle flap.
Double O-Z Flap Text: The defect edges were debeveled with a #15 scalpel blade.  Given the location of the defect, shape of the defect and the proximity to free margins a Double O-Z flap was deemed most appropriate.  Using a sterile surgical marker, an appropriate transposition flap was drawn incorporating the defect and placing the expected incisions within the relaxed skin tension lines where possible. The area thus outlined was incised deep to adipose tissue with a #15 scalpel blade.  The skin margins were undermined to an appropriate distance in all directions utilizing iris scissors.
Double O-Z Plasty Text: The defect edges were debeveled with a #15 scalpel blade.  Given the location of the defect, shape of the defect and the proximity to free margins a Double O-Z plasty (double transposition flap) was deemed most appropriate.  Using a sterile surgical marker, the appropriate transposition flaps were drawn incorporating the defect and placing the expected incisions within the relaxed skin tension lines where possible. The area thus outlined was incised deep to adipose tissue with a #15 scalpel blade.  The skin margins were undermined to an appropriate distance in all directions utilizing iris scissors.  Hemostasis was achieved with electrocautery.  The flaps were then transposed into place, one clockwise and the other counterclockwise, and anchored with interrupted buried subcutaneous sutures.
Double Z Plasty Text: The lesion was extirpated to the level of the fat with a #15 scalpel blade. Given the location of the defect, shape of the defect and the proximity to free margins a double Z-plasty was deemed most appropriate for repair. Using a sterile surgical marker, the appropriate transposition arms of the double Z-plasty were drawn incorporating the defect and placing the expected incisions within the relaxed skin tension lines where possible. The area thus outlined was incised deep to adipose tissue with a #15 scalpel blade. The skin margins were undermined to an appropriate distance in all directions utilizing iris scissors. The opposing transposition arms were then transposed and carried over into place in opposite direction and anchored with interrupted buried subcutaneous sutures.
Ear Star Wedge Flap Text: The defect edges were debeveled with a #15 blade scalpel.  Given the location of the defect and the proximity to free margins (helical rim) an ear star wedge flap was deemed most appropriate.  Using a sterile surgical marker, the appropriate flap was drawn incorporating the defect and placing the expected incisions between the helical rim and antihelix where possible.  The area thus outlined was incised through and through with a #15 scalpel blade.
Flip-Flop Flap Text: The defect edges were debeveled with a #15 blade scalpel.  Given the location of the defect and the proximity to free margins a flip-flop flap was deemed most appropriate. Using a sterile surgical marker, the appropriate flap was drawn incorporating the defect and placing the expected incisions between the helical rim and antihelix where possible.  The area thus outlined was incised through and through with a #15 scalpel blade. Following this, the designed flap was carried over into the primary defect and sutured into place.
Hatchet Flap Text: The defect edges were debeveled with a #15 scalpel blade.  Given the location of the defect, shape of the defect and the proximity to free margins a hatchet flap was deemed most appropriate.  Using a sterile surgical marker, an appropriate hatchet flap was drawn incorporating the defect and placing the expected incisions within the relaxed skin tension lines where possible.    The area thus outlined was incised deep to adipose tissue with a #15 scalpel blade.  The skin margins were undermined to an appropriate distance in all directions utilizing iris scissors.
Helical Rim Advancement Flap Text: The surgical defect and surrounding skin were prepped with Betadine. The choice of the repair was performed because extensive undermining was required, to close a large gap created by lesion removal, and to reduce tension to enhance both functional and cosmetic results. The defect edges were debeveled with a #15 scalpel blade.  Given the size and location of the defect, a helical rim advancement flap was deemed most appropriate. Using a sterile surgical marker, an appropriate helical rim advancement flap was drawn incorporating the defect and placing the expected incisions within the relaxed skin tension lines where possible. The area thus outlined was incised deep to adipose tissue with a #15 scalpel blade. The skin margins were undermined to an appropriate distance in all directions. Following this, the designed flap was advanced and carried over into the primary defect and sutured into place. The subcutaneous tissue and dermis were closed with 4-0 Monocryl. Epidermal closure was achieved with 5-0 Polypropylene (running).  During the repair hemostasis was achieved with Pressure. Petrolatum + pressure dressing were applied.
H Plasty Text: Given the location of the defect, shape of the defect and the proximity to free margins a H-plasty was deemed most appropriate for repair.  Using a sterile surgical marker, the appropriate advancement arms of the H-plasty were drawn incorporating the defect and placing the expected incisions within the relaxed skin tension lines where possible. The area thus outlined was incised deep to adipose tissue with a #15 scalpel blade. The skin margins were undermined to an appropriate distance in all directions utilizing iris scissors.  The opposing advancement arms were then advanced into place in opposite direction and anchored with interrupted buried subcutaneous sutures.
Island Pedicle Flap Text: The surgical defect and surrounding skin were prepped with Betadine. The choice of the repair was performed because extensive undermining was required, to close a large gap created by lesion removal, and to reduce tension to enhance both functional and cosmetic results. The defect edges were debeveled with a #15 scalpel blade.  Given the size and location of the defect, a V-to-Y island pedicle advancement flap was deemed most appropriate. Using a sterile surgical marker, an appropriate V-to-Y island pedicle flap was drawn incorporating the defect and placing the expected incisions within the relaxed skin tension lines where possible. The area thus outlined was incised deep to adipose tissue with a #15 scalpel blade. The skin margins were undermined to an appropriate distance in all directions. Following this, the designed flap was advanced and carried over into the primary defect and sutured into place. The subcutaneous tissue and dermis were closed with 4-0 Monocryl. Epidermal closure was achieved with 5-0 Polypropylene (running).  During the repair hemostasis was achieved with Pressure. Petrolatum + pressure dressing were applied.
Island Pedicle Flap With Canthal Suspension Text: The defect edges were debeveled with a #15 scalpel blade.  Given the location of the defect, shape of the defect and the proximity to free margins an island pedicle advancement flap was deemed most appropriate.  Using a sterile surgical marker, an appropriate advancement flap was drawn incorporating the defect, outlining the appropriate donor tissue and placing the expected incisions within the relaxed skin tension lines where possible. The area thus outlined was incised deep to adipose tissue with a #15 scalpel blade.  The skin margins were undermined to an appropriate distance in all directions around the primary defect and laterally outward around the island pedicle utilizing iris scissors.  There was minimal undermining beneath the pedicle flap. A suspension suture was placed in the canthal tendon to prevent tension and prevent ectropion.
Island Pedicle Flap-Requiring Vessel Identification Text: The defect edges were debeveled with a #15 scalpel blade.  Given the location of the defect, shape of the defect and the proximity to free margins an island pedicle advancement flap was deemed most appropriate.  Using a sterile surgical marker, an appropriate advancement flap was drawn, based on the axial vessel mentioned above, incorporating the defect, outlining the appropriate donor tissue and placing the expected incisions within the relaxed skin tension lines where possible.    The area thus outlined was incised deep to adipose tissue with a #15 scalpel blade.  The skin margins were undermined to an appropriate distance in all directions around the primary defect and laterally outward around the island pedicle utilizing iris scissors.  There was minimal undermining beneath the pedicle flap.
Keystone Flap Text: The defect edges were debeveled with a #15 scalpel blade.  Given the location of the defect, shape of the defect a keystone flap was deemed most appropriate.  Using a sterile surgical marker, an appropriate keystone flap was drawn incorporating the defect, outlining the appropriate donor tissue and placing the expected incisions within the relaxed skin tension lines where possible. The area thus outlined was incised deep to adipose tissue with a #15 scalpel blade.  The skin margins were undermined to an appropriate distance in all directions around the primary defect and laterally outward around the flap utilizing iris scissors.
Melolabial Transposition Flap Text: The defect edges were debeveled with a #15 scalpel blade.  Given the location of the defect and the proximity to free margins a melolabial flap was deemed most appropriate.  Using a sterile surgical marker, an appropriate melolabial transposition flap was drawn incorporating the defect.    The area thus outlined was incised deep to adipose tissue with a #15 scalpel blade.  The skin margins were undermined to an appropriate distance in all directions utilizing iris scissors.
Mercedes Flap Text: The defect edges were debeveled with a #15 scalpel blade.  Given the location of the defect, shape of the defect and the proximity to free margins a Mercedes flap was deemed most appropriate.  Using a sterile surgical marker, an appropriate advancement flap was drawn incorporating the defect and placing the expected incisions within the relaxed skin tension lines where possible. The area thus outlined was incised deep to adipose tissue with a #15 scalpel blade.  The skin margins were undermined to an appropriate distance in all directions utilizing iris scissors.
Modified Advancement Flap Text: The defect edges were debeveled with a #15 scalpel blade.  Given the location of the defect, shape of the defect and the proximity to free margins a modified advancement flap was deemed most appropriate.  Using a sterile surgical marker, an appropriate advancement flap was drawn incorporating the defect and placing the expected incisions within the relaxed skin tension lines where possible.    The area thus outlined was incised deep to adipose tissue with a #15 scalpel blade.  The skin margins were undermined to an appropriate distance in all directions utilizing iris scissors.
Mucosal Advancement Flap Text: The surgical defect and surrounding skin were prepped with Betadine. The choice of the repair was performed because extensive undermining was required, to close a large gap created by lesion removal, and to reduce tension to enhance both functional and cosmetic results. The defect edges were debeveled with a #15 scalpel blade.  Given the size and location of the defect, a mucosal advancement flap was deemed most appropriate. Using a sterile surgical marker, an appropriate mucosal advancement flap was drawn onto the mucosa, incorporating the defect and placing the expected incisions within the relaxed skin tension lines where possible. The area thus outlined was incised deep to adipose tissue with a #15 scalpel blade. The skin margins were undermined to an appropriate distance in all directions. Following this, the designed flap was advanced and carried over into the primary defect and sutured into place. The subcutaneous tissue and dermis were closed with 4-0 Monocryl. Epidermal closure was achieved with 5-0 Polypropylene (running).  During the repair hemostasis was achieved with Pressure. Petrolatum + pressure dressing were applied.
Muscle Hinge Flap Text: The defect edges were debeveled with a #15 scalpel blade.  Given the size, depth and location of the defect and the proximity to free margins a muscle hinge flap was deemed most appropriate.  Using a sterile surgical marker, an appropriate hinge flap was drawn incorporating the defect. The area thus outlined was incised with a #15 scalpel blade.  The skin margins were undermined to an appropriate distance in all directions utilizing iris scissors.
Mustarde Flap Text: The defect edges were debeveled with a #15 scalpel blade.  Given the size, depth and location of the defect and the proximity to free margins a Mustarde flap was deemed most appropriate.  Using a sterile surgical marker, an appropriate flap was drawn incorporating the defect. The area thus outlined was incised with a #15 scalpel blade.  The skin margins were undermined to an appropriate distance in all directions utilizing iris scissors.
Nasal Turnover Hinge Flap Text: The defect edges were debeveled with a #15 scalpel blade.  Given the size, depth, location of the defect and the defect being full thickness a nasal turnover hinge flap was deemed most appropriate.  Using a sterile surgical marker, an appropriate hinge flap was drawn incorporating the defect. The area thus outlined was incised with a #15 scalpel blade. The flap was designed to recreate the nasal mucosal lining and the alar rim. The skin margins were undermined to an appropriate distance in all directions utilizing iris scissors.
Nasalis-Muscle-Based Myocutaneous Island Pedicle Flap Text: Using a #15 blade, an incision was made around the donor flap to the level of the nasalis muscle. Wide lateral undermining was then performed in both the subcutaneous plane above the nasalis muscle, and in a submuscular plane just above periosteum. This allowed the formation of a free nasalis muscle axial pedicle (based on the angular artery) which was still attached to the actual cutaneous flap, increasing its mobility and vascular viability. Hemostasis was obtained with pinpoint electrocoagulation. The flap was mobilized into position and the pivotal anchor points positioned and stabilized with buried interrupted sutures. Subcutaneous and dermal tissues were closed in a multilayered fashion with sutures. Tissue redundancies were excised, and the epidermal edges were apposed without significant tension and sutured with sutures.
Nasalis Myocutaneous Flap Text: Using a #15 blade, an incision was made around the donor flap to the level of the nasalis muscle. Wide lateral undermining was then performed in both the subcutaneous plane above the nasalis muscle, and in a submuscular plane just above periosteum. This allowed the formation of a free nasalis muscle axial pedicle which was still attached to the actual cutaneous flap, increasing its mobility and vascular viability. Hemostasis was obtained with pinpoint electrocoagulation. The flap was mobilized into position and the pivotal anchor points positioned and stabilized with buried interrupted sutures. Subcutaneous and dermal tissues were closed in a multilayered fashion with sutures. Tissue redundancies were excised, and the epidermal edges were apposed without significant tension and sutured with sutures.
Nasolabial Transposition Flap Text: The defect edges were debeveled with a #15 scalpel blade.  Given the size, depth and location of the defect and the proximity to free margins a nasolabial transposition flap was deemed most appropriate. Using a sterile surgical marker, an appropriate flap was drawn incorporating the defect. The area thus outlined was incised with a #15 scalpel blade. The skin margins were undermined to an appropriate distance in all directions utilizing iris scissors. Following this, the designed flap was carried into the primary defect and sutured into place.
Orbicularis Oris Muscle Flap Text: The defect edges were debeveled with a #15 scalpel blade.  Given that the defect affected the competency of the oral sphincter an orbicularis oris muscle flap was deemed most appropriate to restore this competency and normal muscle function.  Using a sterile surgical marker, an appropriate flap was drawn incorporating the defect. The area thus outlined was incised with a #15 scalpel blade.
O-T Advancement Flap Text: The surgical defect and surrounding skin were prepped with Betadine. The choice of the repair was performed because extensive undermining was required, to close a large gap created by lesion removal, and to reduce tension to enhance both functional and cosmetic results. The defect edges were debeveled with a #15 scalpel blade.  Given the size and location of the defect, an O-to-T advancement flap was deemed most appropriate. Using a sterile surgical marker, an appropriate O-to-T advancement flap was drawn incorporating the defect and placing the expected incisions within the relaxed skin tension lines where possible. The area thus outlined was incised deep to adipose tissue with a #15 scalpel blade. The skin margins were undermined to an appropriate distance in all directions. Following this, the designed flap was advanced and carried over into the primary defect and sutured into place. The subcutaneous tissue and dermis were closed with 4-0 Monocryl. Epidermal closure was achieved with 5-0 Polypropylene (running).  During the repair hemostasis was achieved with Pressure. Petrolatum + pressure dressing were applied.
O-T Plasty Text: The defect edges were debeveled with a #15 scalpel blade.  Given the location of the defect, shape of the defect and the proximity to free margins an O-T plasty was deemed most appropriate.  Using a sterile surgical marker, an appropriate O-T plasty was drawn incorporating the defect and placing the expected incisions within the relaxed skin tension lines where possible.    The area thus outlined was incised deep to adipose tissue with a #15 scalpel blade.  The skin margins were undermined to an appropriate distance in all directions utilizing iris scissors.
O-L Flap Text: The surgical defect and surrounding skin were prepped with Betadine. The choice of the repair was performed because extensive undermining was required, to close a large gap created by lesion removal, and to reduce tension to enhance both functional and cosmetic results. The defect edges were debeveled with a #15 scalpel blade.  Given the size and location of the defect, an O-to-L advancement flap was deemed most appropriate. Using a sterile surgical marker, an appropriate O-to-L advancement flap was drawn incorporating the defect and placing the expected incisions within the relaxed skin tension lines where possible. The area thus outlined was incised deep to adipose tissue with a #15 scalpel blade. The skin margins were undermined to an appropriate distance in all directions. Following this, the designed flap was advanced and carried over into the primary defect and sutured into place. The subcutaneous tissue and dermis were closed with 4-0 Monocryl. Epidermal closure was achieved with 5-0 Polypropylene (running).  During the repair hemostasis was achieved with Pressure. Petrolatum + pressure dressing were applied.
O-Z Flap Text: The defect edges were debeveled with a #15 scalpel blade.  Given the location of the defect, shape of the defect and the proximity to free margins an O-Z flap was deemed most appropriate.  Using a sterile surgical marker, an appropriate transposition flap was drawn incorporating the defect and placing the expected incisions within the relaxed skin tension lines where possible. The area thus outlined was incised deep to adipose tissue with a #15 scalpel blade.  The skin margins were undermined to an appropriate distance in all directions utilizing iris scissors.
O-Z Plasty Text: The defect edges were debeveled with a #15 scalpel blade.  Given the location of the defect, shape of the defect and the proximity to free margins an O-Z plasty (double transposition flap) was deemed most appropriate.  Using a sterile surgical marker, the appropriate transposition flaps were drawn incorporating the defect and placing the expected incisions within the relaxed skin tension lines where possible.    The area thus outlined was incised deep to adipose tissue with a #15 scalpel blade.  The skin margins were undermined to an appropriate distance in all directions utilizing iris scissors.  Hemostasis was achieved with electrocautery.  The flaps were then transposed into place, one clockwise and the other counterclockwise, and anchored with interrupted buried subcutaneous sutures.
Peng Advancement Flap Text: The defect edges were debeveled with a #15 scalpel blade.  Given the location of the defect, shape of the defect and the proximity to free margins a Peng advancement flap was deemed most appropriate.  Using a sterile surgical marker, an appropriate advancement flap was drawn incorporating the defect and placing the expected incisions within the relaxed skin tension lines where possible. The area thus outlined was incised deep to adipose tissue with a #15 scalpel blade.  The skin margins were undermined to an appropriate distance in all directions utilizing iris scissors.
Rectangular Flap Text: The defect edges were debeveled with a #15 scalpel blade. Given the location of the defect and the proximity to free margins a rectangular flap was deemed most appropriate. Using a sterile surgical marker, an appropriate rectangular flap was drawn incorporating the defect. The area thus outlined was incised deep to adipose tissue with a #15 scalpel blade. The skin margins were undermined to an appropriate distance in all directions utilizing iris scissors. Following this, the designed flap was carried over into the primary defect and sutured into place.
Rhombic Flap Text: The surgical defect and surrounding skin were prepped with Betadine. The choice of the repair was performed because extensive undermining was required, to close a large gap created by lesion removal, and to reduce tension to enhance both functional and cosmetic results. The defect edges were debeveled with a #15 scalpel blade.  Given the size and location of the defect, a rhombic transposition flap was deemed most appropriate. Using a sterile surgical marker, an appropriate rhombic transposition flap was drawn incorporating the defect and placing the expected incisions within the relaxed skin tension lines where possible. The area thus outlined was incised deep to adipose tissue with a #15 scalpel blade. The skin margins were undermined to an appropriate distance in all directions. Following this, the designed flap was advanced and carried over into the primary defect and sutured into place. The subcutaneous tissue and dermis were closed with 4-0 Monocryl. Epidermal closure was achieved with 5-0 Polypropylene (running).  During the repair hemostasis was achieved with Pressure. Petrolatum + pressure dressing were applied.
Rotation Flap Text: The surgical defect and surrounding skin were prepped with Betadine. The choice of the repair was performed because extensive undermining was required, to close a large gap created by lesion removal, and to reduce tension to enhance both functional and cosmetic results. The defect edges were debeveled with a #15 scalpel blade.  Given the size and location of the defect, a rotation flap was deemed most appropriate. Using a sterile surgical marker, an appropriate rotation flap was drawn incorporating the defect and placing the expected incisions within the relaxed skin tension lines where possible. The area thus outlined was incised deep to adipose tissue with a #15 scalpel blade. The skin margins were undermined to an appropriate distance in all directions. Following this, the designed flap was advanced and carried over into the primary defect and sutured into place. The subcutaneous tissue and dermis were closed with 4-0 Monocryl. Epidermal closure was achieved with 5-0 Polypropylene (running).  During the repair hemostasis was achieved with Pressure. Petrolatum + pressure dressing were applied.
Spiral Flap Text: The defect edges were debeveled with a #15 scalpel blade.  Given the location of the defect, shape of the defect and the proximity to free margins a spiral flap was deemed most appropriate.  Using a sterile surgical marker, an appropriate rotation flap was drawn incorporating the defect and placing the expected incisions within the relaxed skin tension lines where possible. The area thus outlined was incised deep to adipose tissue with a #15 scalpel blade.  The skin margins were undermined to an appropriate distance in all directions utilizing iris scissors.
Staged Advancement Flap Text: The defect edges were debeveled with a #15 scalpel blade.  Given the location of the defect, shape of the defect and the proximity to free margins a staged advancement flap was deemed most appropriate.  Using a sterile surgical marker, an appropriate advancement flap was drawn incorporating the defect and placing the expected incisions within the relaxed skin tension lines where possible. The area thus outlined was incised deep to adipose tissue with a #15 scalpel blade.  The skin margins were undermined to an appropriate distance in all directions utilizing iris scissors.
Star Wedge Flap Text: The defect edges were debeveled with a #15 scalpel blade.  Given the location of the defect, shape of the defect and the proximity to free margins a star wedge flap was deemed most appropriate.  Using a sterile surgical marker, an appropriate rotation flap was drawn incorporating the defect and placing the expected incisions within the relaxed skin tension lines where possible. The area thus outlined was incised deep to adipose tissue with a #15 scalpel blade.  The skin margins were undermined to an appropriate distance in all directions utilizing iris scissors.
Transposition Flap Text: The surgical defect and surrounding skin were prepped with Betadine. The choice of the repair was performed because extensive undermining was required, to close a large gap created by lesion removal, and to reduce tension to enhance both functional and cosmetic results. The defect edges were debeveled with a #15 scalpel blade.  Given the size and location of the defect, a transposition flap was deemed most appropriate. Using a sterile surgical marker, an appropriate transposition flap was drawn incorporating the defect and placing the expected incisions within the relaxed skin tension lines where possible. The area thus outlined was incised deep to adipose tissue with a #15 scalpel blade. The skin margins were undermined to an appropriate distance in all directions. Following this, the designed flap was advanced and carried over into the primary defect and sutured into place. The subcutaneous tissue and dermis were closed with 4-0 Monocryl. Epidermal closure was achieved with 5-0 Polypropylene (running).  During the repair hemostasis was achieved with Pressure. Petrolatum + pressure dressing were applied.
Trilobed Flap Text: The defect edges were debeveled with a #15 scalpel blade.  Given the location of the defect and the proximity to free margins a trilobed flap was deemed most appropriate.  Using a sterile surgical marker, an appropriate trilobed flap drawn around the defect.    The area thus outlined was incised deep to adipose tissue with a #15 scalpel blade.  The skin margins were undermined to an appropriate distance in all directions utilizing iris scissors.
V-Y Plasty Text: The defect edges were debeveled with a #15 scalpel blade.  Given the location of the defect, shape of the defect and the proximity to free margins an V-Y advancement flap was deemed most appropriate.  Using a sterile surgical marker, an appropriate advancement flap was drawn incorporating the defect and placing the expected incisions within the relaxed skin tension lines where possible.    The area thus outlined was incised deep to adipose tissue with a #15 scalpel blade.  The skin margins were undermined to an appropriate distance in all directions utilizing iris scissors.
W Plasty Text: The lesion was extirpated to the level of the fat with a #15 scalpel blade.  Given the location of the defect, shape of the defect and the proximity to free margins a W-plasty was deemed most appropriate for repair.  Using a sterile surgical marker, the appropriate transposition arms of the W-plasty were drawn incorporating the defect and placing the expected incisions within the relaxed skin tension lines where possible.    The area thus outlined was incised deep to adipose tissue with a #15 scalpel blade.  The skin margins were undermined to an appropriate distance in all directions utilizing iris scissors.  The opposing transposition arms were then transposed into place in opposite direction and anchored with interrupted buried subcutaneous sutures.
Z Plasty Text: The lesion was extirpated to the level of the fat with a #15 scalpel blade.  Given the location of the defect, shape of the defect and the proximity to free margins a Z-plasty was deemed most appropriate for repair.  Using a sterile surgical marker, the appropriate transposition arms of the Z-plasty were drawn incorporating the defect and placing the expected incisions within the relaxed skin tension lines where possible.    The area thus outlined was incised deep to adipose tissue with a #15 scalpel blade.  The skin margins were undermined to an appropriate distance in all directions utilizing iris scissors.  The opposing transposition arms were then transposed into place in opposite direction and anchored with interrupted buried subcutaneous sutures.
Zygomaticofacial Flap Text: Given the location of the defect, shape of the defect and the proximity to free margins a zygomaticofacial flap was deemed most appropriate for repair.  Using a sterile surgical marker, the appropriate flap was drawn incorporating the defect and placing the expected incisions within the relaxed skin tension lines where possible. The area thus outlined was incised deep to adipose tissue with a #15 scalpel blade with preservation of a vascular pedicle.  The skin margins were undermined to an appropriate distance in all directions utilizing iris scissors.  The flap was then placed into the defect and anchored with interrupted buried subcutaneous sutures.
Abbe Flap (Lower To Upper Lip) Text: The defect of the upper lip was assessed and measured.  Given the location and size of the defect, an Abbe flap was deemed most appropriate.  Using a sterile surgical marker, an appropriate Abbe flap was measured and drawn on the lower lip. Local anesthesia was then infiltrated. A scalpel was then used to incise the upper lip through and through the skin, vermilion, muscle and mucosa, leaving the flap pedicled on the opposite side.  The flap was then rotated and transferred to the lower lip defect.  The flap was then sutured into place with a three layer technique, closing the orbicularis oris muscle layer with subcutaneous buried sutures, followed by a mucosal layer and an epidermal layer.
Abbe Flap (Upper To Lower Lip) Text: The defect of the lower lip was assessed and measured.  Given the location and size of the defect, an Abbe flap was deemed most appropriate.  Using a sterile surgical marker, an appropriate Abbe flap was measured and drawn on the upper lip. Local anesthesia was then infiltrated.  A scalpel was then used to incise the upper lip through and through the skin, vermilion, muscle and mucosa, leaving the flap pedicled on the opposite side.  The flap was then rotated and transferred to the lower lip defect.  The flap was then sutured into place with a three layer technique, closing the orbicularis oris muscle layer with subcutaneous buried sutures, followed by a mucosal layer and an epidermal layer.
Cheek Interpolation Flap Text: A decision was made to reconstruct the defect utilizing an interpolation axial flap and a staged reconstruction.  A telfa template was made of the defect.  This telfa template was then used to outline the Cheek Interpolation flap.  The donor area for the pedicle flap was then injected with anesthesia.  The flap was excised through the skin and subcutaneous tissue down to the layer of the underlying musculature.  The interpolation flap was carefully excised within this deep plane to maintain its blood supply.  The edges of the donor site were undermined.   The donor site was closed in a primary fashion.  The pedicle was then rotated into position and sutured.  Once the tube was sutured into place, adequate blood supply was confirmed with blanching and refill.  The pedicle was then wrapped with xeroform gauze and dressed appropriately with a telfa and gauze bandage to ensure continued blood supply and protect the attached pedicle.
Cheek-To-Nose Interpolation Flap Text: A decision was made to reconstruct the defect utilizing an interpolation axial flap and a staged reconstruction.  A telfa template was made of the defect.  This telfa template was then used to outline the Cheek-To-Nose Interpolation flap.  The donor area for the pedicle flap was then injected with anesthesia.  The flap was excised through the skin and subcutaneous tissue down to the layer of the underlying musculature.  The interpolation flap was carefully excised within this deep plane to maintain its blood supply.  The edges of the donor site were undermined.   The donor site was closed in a primary fashion.  The pedicle was then rotated into position and sutured.  Once the tube was sutured into place, adequate blood supply was confirmed with blanching and refill.  The pedicle was then wrapped with xeroform gauze and dressed appropriately with a telfa and gauze bandage to ensure continued blood supply and protect the attached pedicle.
Estlander Flap (Lower To Upper Lip) Text: The defect of the lower lip was assessed and measured.  Given the location and size of the defect, an Estlander flap was deemed most appropriate.  Using a sterile surgical marker, an appropriate Estlander flap was measured and drawn on the upper lip. Local anesthesia was then infiltrated. A scalpel was then used to incise the lateral aspect of the flap, through skin, muscle and mucosa, leaving the flap pedicled medially.  The flap was then rotated and positioned to fill the lower lip defect.  The flap was then sutured into place with a three layer technique, closing the orbicularis oris muscle layer with subcutaneous buried sutures, followed by a mucosal layer and an epidermal layer.
Interpolation Flap Text: A decision was made to reconstruct the defect utilizing an interpolation axial flap and a staged reconstruction.  A telfa template was made of the defect.  This telfa template was then used to outline the interpolation flap.  The donor area for the pedicle flap was then injected with anesthesia.  The flap was excised through the skin and subcutaneous tissue down to the layer of the underlying musculature.  The interpolation flap was carefully excised within this deep plane to maintain its blood supply.  The edges of the donor site were undermined.   The donor site was closed in a primary fashion.  The pedicle was then rotated into position and sutured.  Once the tube was sutured into place, adequate blood supply was confirmed with blanching and refill.  The pedicle was then wrapped with xeroform gauze and dressed appropriately with a telfa and gauze bandage to ensure continued blood supply and protect the attached pedicle.
Melolabial Interpolation Flap Text: A decision was made to reconstruct the defect utilizing an interpolation axial flap and a staged reconstruction.  A telfa template was made of the defect.  This telfa template was then used to outline the melolabial interpolation flap.  The donor area for the pedicle flap was then injected with anesthesia.  The flap was excised through the skin and subcutaneous tissue down to the layer of the underlying musculature.  The pedicle flap was carefully excised within this deep plane to maintain its blood supply.  The edges of the donor site were undermined.   The donor site was closed in a primary fashion.  The pedicle was then rotated into position and sutured.  Once the tube was sutured into place, adequate blood supply was confirmed with blanching and refill.  The pedicle was then wrapped with xeroform gauze and dressed appropriately with a telfa and gauze bandage to ensure continued blood supply and protect the attached pedicle.
Mastoid Interpolation Flap Text: A decision was made to reconstruct the defect utilizing an interpolation axial flap and a staged reconstruction.  A telfa template was made of the defect.  This telfa template was then used to outline the mastoid interpolation flap.  The donor area for the pedicle flap was then injected with anesthesia.  The flap was excised through the skin and subcutaneous tissue down to the layer of the underlying musculature.  The pedicle flap was carefully excised within this deep plane to maintain its blood supply.  The edges of the donor site were undermined.   The donor site was closed in a primary fashion.  The pedicle was then rotated into position and sutured.  Once the tube was sutured into place, adequate blood supply was confirmed with blanching and refill.  The pedicle was then wrapped with xeroform gauze and dressed appropriately with a telfa and gauze bandage to ensure continued blood supply and protect the attached pedicle.
Paramedian Forehead Flap Text: A decision was made to reconstruct the defect utilizing an interpolation axial flap and a staged reconstruction.  A telfa template was made of the defect.  This telfa template was then used to outline the paramedian forehead pedicle flap.  The donor area for the pedicle flap was then injected with anesthesia.  The flap was excised through the skin and subcutaneous tissue down to the layer of the underlying musculature.  The pedicle flap was carefully excised within this deep plane to maintain its blood supply.  The edges of the donor site were undermined.   The donor site was closed in a primary fashion.  The pedicle was then rotated into position and sutured.  Once the tube was sutured into place, adequate blood supply was confirmed with blanching and refill.  The pedicle was then wrapped with xeroform gauze and dressed appropriately with a telfa and gauze bandage to ensure continued blood supply and protect the attached pedicle.
Posterior Auricular Interpolation Flap Text: A decision was made to reconstruct the defect utilizing an interpolation axial flap and a staged reconstruction.  A telfa template was made of the defect.  This telfa template was then used to outline the posterior auricular interpolation flap.  The donor area for the pedicle flap was then injected with anesthesia.  The flap was excised through the skin and subcutaneous tissue down to the layer of the underlying musculature.  The pedicle flap was carefully excised within this deep plane to maintain its blood supply.  The edges of the donor site were undermined.   The donor site was closed in a primary fashion.  The pedicle was then rotated into position and sutured.  Once the tube was sutured into place, adequate blood supply was confirmed with blanching and refill.  The pedicle was then wrapped with xeroform gauze and dressed appropriately with a telfa and gauze bandage to ensure continued blood supply and protect the attached pedicle.
Cheiloplasty (Complex) Text: A decision was made to reconstruct the defect with a  cheiloplasty.  The defect was undermined extensively.  Additional orbicularis oris muscle was excised with a 15 blade scalpel.  The defect was converted into a full thickness wedge to facilite a better cosmetic result.  Small vessels were then tied off with 5-0 monocyrl. The orbicularis oris, superficial fascia, adipose and dermis were then reapproximated.  After the deeper layers were approximated the epidermis was reapproximated with particular care given to realign the vermilion border.
Cheiloplasty (Less Than 50%) Text: A decision was made to reconstruct the defect with a  cheiloplasty.  The defect was undermined extensively.  Additional orbicularis oris muscle was excised with a 15 blade scalpel.  The defect was converted into a full thickness wedge, of less than 50% of the vertical height of the lip, to facilite a better cosmetic result.  Small vessels were then tied off with 5-0 monocyrl. The orbicularis oris, superficial fascia, adipose and dermis were then reapproximated.  After the deeper layers were approximated the epidermis was reapproximated with particular care given to realign the vermilion border.
Ear Wedge Repair Text: A wedge excision was completed by carrying down an excision through the full thickness of the ear and cartilage with an inward facing Burow's triangle. The wound was then closed in a layered fashion.
Full Thickness Lip Wedge Repair (Flap) Text: Given the location of the defect and the proximity to free margins a full thickness wedge repair was deemed most appropriate.  Using a sterile surgical marker, the appropriate repair was drawn incorporating the defect and placing the expected incisions perpendicular to the vermilion border.  The vermilion border was also meticulously outlined to ensure appropriate reapproximation during the repair.  The area thus outlined was incised through and through with a #15 scalpel blade.  The muscularis and dermis were reaproximated with deep sutures following hemostasis. Care was taken to realign the vermilion border before proceeding with the superficial closure.  Once the vermilion was realigned the superfical and mucosal closure was finished.
Burow's Graft Text: The defect edges were debeveled with a #15 scalpel blade.  Given the location of the defect, shape of the defect, the proximity to free margins and the presence of a standing cone deformity a Burow's skin graft was deemed most appropriate. The standing cone was removed and this tissue was then trimmed to the shape of the primary defect. The adipose tissue was also removed until only dermis and epidermis were left.  The skin margins of the secondary defect were undermined to an appropriate distance in all directions utilizing iris scissors.  The secondary defect was closed with interrupted buried subcutaneous sutures.  The skin edges were then re-apposed with running  sutures.  The skin graft was then placed in the primary defect and oriented appropriately.
Cartilage Graft Text: The defect edges were debeveled with a #15 scalpel blade.  Given the location of the defect, shape of the defect, the fact the defect involved a full thickness cartilage defect a cartilage graft was deemed most appropriate.  An appropriate donor site was identified, cleansed, and anesthetized. The cartilage graft was then harvested and transferred to the recipient site, oriented appropriately and then sutured into place.  The secondary defect was then repaired using a primary closure.
Composite Graft Text: The defect edges were debeveled with a #15 scalpel blade.  Given the location of the defect, shape of the defect, the proximity to free margins and the fact the defect was full thickness a composite graft was deemed most appropriate.  The defect was outline and then transferred to the donor site.  A full thickness graft was then excised from the donor site. The graft was then placed in the primary defect, oriented appropriately and then sutured into place.  The secondary defect was then repaired using a primary closure.
Epidermal Autograft Text: The defect edges were debeveled with a #15 scalpel blade.  Given the location of the defect, shape of the defect and the proximity to free margins an epidermal autograft was deemed most appropriate.  Using a sterile surgical marker, the primary defect shape was transferred to the donor site. The epidermal graft was then harvested.  The skin graft was then placed in the primary defect and oriented appropriately.
Dermal Autograft Text: The defect edges were debeveled with a #15 scalpel blade.  Given the location of the defect, shape of the defect and the proximity to free margins a dermal autograft was deemed most appropriate.  Using a sterile surgical marker, the primary defect shape was transferred to the donor site. The area thus outlined was incised deep to adipose tissue with a #15 scalpel blade.  The harvested graft was then trimmed of adipose and epidermal tissue until only dermis was left.  The skin graft was then placed in the primary defect and oriented appropriately.
Ftsg Text: Given the location of the defect, shape of the defect and the proximity to free margins a full thickness skin graft was deemed most appropriate. Using a sterile surgical marker, the primary defect shape was transferred to the donor site. The area thus outlined was incised deep to adipose tissue with a #15 scalpel blade. The harvested graft was then trimmed of adipose tissue until only dermis and epidermis was left. The skin graft was then placed in the primary defect and oriented appropriately. The donor site will heal by secondary intention.
Pinch Graft Text: The defect edges were debeveled with a #15 scalpel blade. Given the location of the defect, shape of the defect and the proximity to free margins a pinch graft was deemed most appropriate. Using a sterile surgical marker, the primary defect shape was transferred to the donor site. The area thus outlined was incised deep to adipose tissue with a #15 scalpel blade.  The harvested graft was then trimmed of adipose tissue until only dermis and epidermis was left. The skin margins of the secondary defect were undermined to an appropriate distance in all directions utilizing iris scissors.  The secondary defect was closed with interrupted buried subcutaneous sutures.  The skin edges were then re-apposed with running  sutures.  The skin graft was then placed in the primary defect and oriented appropriately.
Skin Substitute Text: The defect edges were debeveled with a #15 scalpel blade.  Given the location of the defect, shape of the defect and the proximity to free margins a skin substitute graft was deemed most appropriate.  The graft material was trimmed to fit the size of the defect. The graft was then placed in the primary defect and oriented appropriately.
Split-Thickness Skin Graft Text: The defect edges were debeveled with a #15 scalpel blade.  Given the location of the defect, shape of the defect and the proximity to free margins a split thickness skin graft was deemed most appropriate.  Using a sterile surgical marker, the primary defect shape was transferred to the donor site. The split thickness graft was then harvested.  The skin graft was then placed in the primary defect and oriented appropriately.
Tissue Cultured Epidermal Autograft Text: The defect edges were debeveled with a #15 scalpel blade.  Given the location of the defect, shape of the defect and the proximity to free margins a tissue cultured epidermal autograft was deemed most appropriate.  The graft was then trimmed to fit the size of the defect.  The graft was then placed in the primary defect and oriented appropriately.
Xenograft Text: The defect edges were debeveled with a #15 scalpel blade.  Given the location of the defect, shape of the defect and the proximity to free margins a xenograft was deemed most appropriate.  The graft was then trimmed to fit the size of the defect.  The graft was then placed in the primary defect and oriented appropriately.
Complex Repair And Flap Additional Text (Will Appearing After The Standard Complex Repair Text): The complex repair was not sufficient to completely close the primary defect. The remaining additional defect was repaired with the flap mentioned below.
Complex Repair And Graft Additional Text (Will Appearing After The Standard Complex Repair Text): The complex repair was not sufficient to completely close the primary defect. The remaining additional defect was repaired with the graft mentioned below.
Eyelid Full Thickness Repair - 32391: The eyelid defect was full thickness which required a wedge repair of the eyelid. Special care was taken to ensure that the eyelid margin was realligned when placing sutures.
Eyelid Partial Thickness Repair - 70892: The eyelid defect was partial thickness which required a wedge repair of the eyelid. Special care was taken to ensure that the eyelid margin was realligned when placing sutures.
Intermediate Repair And Flap Additional Text (Will Appearing After The Standard Complex Repair Text): The intermediate repair was not sufficient to completely close the primary defect. The remaining additional defect was repaired with the flap mentioned below.
Intermediate Repair And Graft Additional Text (Will Appearing After The Standard Complex Repair Text): The intermediate repair was not sufficient to completely close the primary defect. The remaining additional defect was repaired with the graft mentioned below.
Localized Dermabrasion With 15 Blade Text: The patient was draped in routine manner.  Localized dermabrasion using a 15 blade was performed in routine manner to papillary dermis. This spot dermabrasion is being performed to complete skin cancer reconstruction. It also will eliminate the other sun damaged precancerous cells that are known to be part of the regional effect of a lifetime's worth of sun exposure. This localized dermabrasion is therapeutic and should not be considered cosmetic in any regard.
Localized Dermabrasion With Sand Papertext: The patient was draped in routine manner.  Localized dermabrasion using sterile sand paper was performed in routine manner to papillary dermis. This spot dermabrasion is being performed to complete skin cancer reconstruction. It also will eliminate the other sun damaged precancerous cells that are known to be part of the regional effect of a lifetime's worth of sun exposure. This localized dermabrasion is therapeutic and should not be considered cosmetic in any regard.
Localized Dermabrasion With Wire Brush Text: The patient was draped in routine manner.  Localized dermabrasion using 3 x 17 mm wire brush was performed in routine manner to papillary dermis. This spot dermabrasion is being performed to complete skin cancer reconstruction. It also will eliminate the other sun damaged precancerous cells that are known to be part of the regional effect of a lifetime's worth of sun exposure. This localized dermabrasion is therapeutic and should not be considered cosmetic in any regard.
Purse String (Simple) Text: Given the location of the defect and the characteristics of the surrounding skin a purse string closure was deemed most appropriate.  Undermining was performed circumfirentially around the surgical defect.  A purse string suture was then placed and tightened.
Purse String (Intermediate) Text: Given the location of the defect and the characteristics of the surrounding skin a purse string intermediate closure was deemed most appropriate.  Undermining was performed circumfirentially around the surgical defect.  A purse string suture was then placed and tightened.
Partial Purse String (Simple) Text: Given the location of the defect and the characteristics of the surrounding skin a simple purse string closure was deemed most appropriate.  Undermining was performed circumfirentially around the surgical defect.  A purse string suture was then placed and tightened. Wound tension only allowed a partial closure of the circular defect.
Partial Purse String (Intermediate) Text: Given the location of the defect and the characteristics of the surrounding skin an intermediate purse string closure was deemed most appropriate.  Undermining was performed circumfirentially around the surgical defect.  A purse string suture was then placed and tightened. Wound tension only allowed a partial closure of the circular defect.
Tarsorrhaphy Text: A tarsorrhaphy was performed using Frost sutures.
Manual Repair Warning Statement: We plan on removing the manually selected variable below in favor of our much easier automatic structured text blocks found in the previous tab. We decided to do this to help make the flow better and give you the full power of structured data. Manual selection is never going to be ideal in our platform and I would encourage you to avoid using manual selection from this point on, especially since I will be sunsetting this feature. It is important that you do one of two things with the customized text below. First, you can save all of the text in a word file so you can have it for future reference. Second, transfer the text to the appropriate area in the Library tab. Lastly, if there is a flap or graft type which we do not have you need to let us know right away so I can add it in before the variable is hidden. No need to panic, we plan to give you roughly 6 months to make the change.
Same Histology In Subsequent Stages Text: The pattern and morphology of the tumor is as described in the first stage.
No Residual Tumor Seen Histology Text: There were no malignant cells seen in the sections examined.
Inflammation Suggestive Of Cancer Camouflage Histology Text: There was a dense lymphocytic infiltrate which prevented adequate histologic evaluation of adjacent structures.
Bcc Histology Text: There were numerous aggregates of basaloid cells.
Bcc Infiltrative Histology Text: There were numerous aggregates of basaloid cells demonstrating an infiltrative pattern.
Mart-1 - Positive Histology Text: MART-1 staining demonstrates areas of higher density and clustering of melanocytes with Pagetoid spread upwards within the epidermis. The surgical margins are positive for tumor cells.
Mart-1 - Negative Histology Text: MART-1 staining demonstrates a normal density and pattern of melanocytes along the dermal-epidermal junction. The surgical margins are negative for tumor cells.
Information: Selecting Yes will display possible errors in your note based on the variables you have selected. This validation is only offered as a suggestion for you. PLEASE NOTE THAT THE VALIDATION TEXT WILL BE REMOVED WHEN YOU FINALIZE YOUR NOTE. IF YOU WANT TO FAX A PRELIMINARY NOTE YOU WILL NEED TO TOGGLE THIS TO 'NO' IF YOU DO NOT WANT IT IN YOUR FAXED NOTE.
Bill 59 Modifier?: No - Continue to Bill 79 Modifier